# Patient Record
Sex: FEMALE | Race: WHITE | NOT HISPANIC OR LATINO | Employment: UNEMPLOYED | URBAN - METROPOLITAN AREA
[De-identification: names, ages, dates, MRNs, and addresses within clinical notes are randomized per-mention and may not be internally consistent; named-entity substitution may affect disease eponyms.]

---

## 2021-09-20 ENCOUNTER — MEDICAL CORRESPONDENCE (OUTPATIENT)
Dept: HEALTH INFORMATION MANAGEMENT | Facility: CLINIC | Age: 56
End: 2021-09-20

## 2021-09-25 ENCOUNTER — TRANSCRIBE ORDERS (OUTPATIENT)
Dept: OTHER | Age: 56
End: 2021-09-25

## 2021-09-25 DIAGNOSIS — M54.2 CERVICALGIA: Primary | ICD-10-CM

## 2021-10-06 ENCOUNTER — TRANSFERRED RECORDS (OUTPATIENT)
Dept: HEALTH INFORMATION MANAGEMENT | Facility: CLINIC | Age: 56
End: 2021-10-06

## 2022-08-05 ENCOUNTER — TRANSFERRED RECORDS (OUTPATIENT)
Dept: HEALTH INFORMATION MANAGEMENT | Facility: CLINIC | Age: 57
End: 2022-08-05

## 2022-08-08 ENCOUNTER — MEDICAL CORRESPONDENCE (OUTPATIENT)
Dept: HEALTH INFORMATION MANAGEMENT | Facility: CLINIC | Age: 57
End: 2022-08-08

## 2022-08-10 NOTE — TELEPHONE ENCOUNTER
DIAGNOSIS: cervical consult C7-T1 and T1-2, per pt ref by Dr. Santhosh Medrano at [facility] , MRI's and CT's taken @ [facility]    APPOINTMENT DATE: 8.25.22   NOTES STATUS DETAILS   OFFICE NOTE from referring provider     Operative Reprot Care Everywhere 11.3.17 FUSION ANTERIOR POSTERIOR CERVICAL LEVEL 01    10.16.14 FUSION DISCECTOMY ANTERIOR CERVICAL 02          LABS     MRI In process 7.14.22 cervical, thoracic spine, [facility]  4.3.18 cervical spine, [facility]  8.8.17 cervical spine, [facility]  9.22.14 cervical spine, [facility]   CT SCAN In process 9.2.21 cervical spine, [facility] - PACS  8.25.17 cervical spine, [facility]   XRAYS (IMAGES & REPORTS) In process 11.20.18 cervical spine, [facility]  2.14.18 cervical spine, [facility] - PACS  12.19.17 cervical spine, [facility]PAC  11.4.17 cervical spine, [facility] - PACS  11.3.17 cervical spine, [facility] PACS  8.31.17 cervical spine, [facility]  1.20.15 cervical spine, [facility]  10.18.14 cervical spine, [facility] PACS  10.16.14 cervical spine, [facility] PACS  9.18.14 cervical spine, [facility]     Action 8.10.22 3:35 PM [facility]   Action Taken Faxed request to [facility] [phone] for records. And [facility] for images [phone]     Action 8.19.22 10:53 AM [facility]   Action Taken Some images received. Faxed request to [facility] for other images [phone]      Action 8.24.22 MJ   Action Taken Pulled images into PACS, however not all images were received that were requested. Sent URGENT request     Action 8.25.22 MJ   Action Taken No images pushed. Called film room at [phone] No answer, will call back.  Called back- images at [facility]. Called [place] @ [phone] routed to [phone] [email or web address] [phone]    UPDATE: pulled numerous images into PACS.   Called and spoke with Kat at [facility]- 5 images didn't make it through. Kat stated she sent them all but will retry.    Pulled the rest of the images into PACs     Action August 25, 2022 1:10 PM  MT   Action Taken SENT A STAT REQUEST FOR MISSING MRI TO [facility] [phone].

## 2022-08-16 DIAGNOSIS — M54.2 NECK PAIN: Primary | ICD-10-CM

## 2022-08-25 ENCOUNTER — OFFICE VISIT (OUTPATIENT)
Dept: ORTHOPEDICS | Facility: CLINIC | Age: 57
End: 2022-08-25
Payer: COMMERCIAL

## 2022-08-25 ENCOUNTER — ANCILLARY PROCEDURE (OUTPATIENT)
Dept: GENERAL RADIOLOGY | Facility: CLINIC | Age: 57
End: 2022-08-25
Attending: ORTHOPAEDIC SURGERY
Payer: COMMERCIAL

## 2022-08-25 ENCOUNTER — PRE VISIT (OUTPATIENT)
Dept: ORTHOPEDICS | Facility: CLINIC | Age: 57
End: 2022-08-25

## 2022-08-25 DIAGNOSIS — M51.24 THORACIC DISC HERNIATION: Primary | ICD-10-CM

## 2022-08-25 DIAGNOSIS — M51.04 INTERVERTEBRAL DISC DISORDER OF THORACIC REGION WITH MYELOPATHY: ICD-10-CM

## 2022-08-25 DIAGNOSIS — M54.2 CERVICALGIA: ICD-10-CM

## 2022-08-25 DIAGNOSIS — M54.2 NECK PAIN: ICD-10-CM

## 2022-08-25 PROCEDURE — 72040 X-RAY EXAM NECK SPINE 2-3 VW: CPT | Mod: 77 | Performed by: STUDENT IN AN ORGANIZED HEALTH CARE EDUCATION/TRAINING PROGRAM

## 2022-08-25 PROCEDURE — 99204 OFFICE O/P NEW MOD 45 MIN: CPT | Mod: GC | Performed by: ORTHOPAEDIC SURGERY

## 2022-08-25 PROCEDURE — 72040 X-RAY EXAM NECK SPINE 2-3 VW: CPT | Mod: GC | Performed by: RADIOLOGY

## 2022-08-25 RX ORDER — LISINOPRIL AND HYDROCHLOROTHIAZIDE 12.5; 2 MG/1; MG/1
1 TABLET ORAL EVERY MORNING
Status: ON HOLD | COMMUNITY
Start: 2021-10-15 | End: 2022-11-11

## 2022-08-25 RX ORDER — FLUOXETINE 10 MG/1
10 CAPSULE ORAL AT BEDTIME
COMMUNITY
Start: 2022-08-17

## 2022-08-25 RX ORDER — FLUTICASONE PROPIONATE AND SALMETEROL XINAFOATE 115; 21 UG/1; UG/1
AEROSOL, METERED RESPIRATORY (INHALATION)
COMMUNITY
Start: 2022-08-23

## 2022-08-25 NOTE — LETTER
8/25/2022         RE: Emeli Beyer  [address] [facility] [address]        Dear Colleague,    Thank you for referring your patient, Emeli Beyer, to the [facility] [place]. Please see a copy of my visit note below.    Spine Surgical Hx:  ~2004 - ACDF with plate fixation C5-C7 (?).  Complicated by postop hoarseness.  10/16/2014 - ACDF with plate fixation C3-C5 (2 levels); use of structural corticocancellous allograft; Removal of anterior plate C5-C7 (CARLOS Medrano) for CSM.  [Implants: Synthes Vectra plate].  11/03/2017 - ACDF with platle fixation C7-T1; use of Cornerstone corticocancellous allograft 14 x 14mm and [place] DBM.  (CARLOS Medrano).  [Implants: Medtronic Zevo plate].      In-Person Visit    REASON FOR CONSULTATION: Consult (Thoracic back pain. )     REFERRING PHYSICIAN: Referred Self  PCP:No primary care provider on file.    History of Present Illness:  57 year old right-hand-dominant female, with past surgical history stated above presents as a referral from Dr. Medrano for treatment of a large T6-7 herniated disc with severe central canal stenosis and myelopathic presentation.  The patient states that she has a long history of back/neck pain that has been followed by Dr. Medrano at [facility]. She reports a fail in February and increased mid back pain after that point. Since that time she has had increase weakness in her lower extremities, numbness in her feet, and difficulty with balance and walking. She denies loss of bowel/bladder function or saddle anesthesia. The patient reports the she previously has been treated with injections to her back which have provided some relief. She has had not relief with PT, gabapentin, or antiinflammatories.     Back 50%, Leg 50%,  Right = Left    Previous treatment:   Lidocaine/steroid injections, PT, Gabapentin and anti-inflammatories     Oswestry (MARCO) Questionnaire    OSWESTRY DISABILITY INDEX 8/25/2022   Count  10   Sum 18   Oswestry Score (%) 36   Some recent data might be hidden        Neck Disability Index (NDI) Questionnaire    Neck Disability Index (NDI) 8/25/2022   Neck Disability Index: Count 10   NDI: Total Score = SUM (points for all 10 findings) 22   Neck Disability in Percent = (Total Score) / 50 * 100 44 (%)   Some recent data might be hidden        Visual Analog Pain Scale  Back Pain Scale 0-10: 3  Right leg pain: 0  Left leg pain: 0    PROMIS-10 Scores  Global Mental Health Score: (P) 11  Global Physical Health Score: (P) 13  PROMIS TOTAL - SUBSCORES: (P) 24    ROS:  A 12-point review of systems was completed and is negative except for otherwise noted above in the history of present illness.    Med Hx:  No past medical history on file.    Surg Hx:  No past surgical history on file.    Allergies:  Allergies   Allergen Reactions     Penicillins Rash and Shortness Of Breath     Penicillins     Clindamycin Rash     Naproxen Rash     Anaprox TABS       Meds:  Current Outpatient Medications   Medication     FLUoxetine (PROZAC) 10 MG capsule     lisinopril-hydrochlorothiazide (ZESTORETIC) 20-12.5 MG tablet     ADVAIR -21 MCG/ACT inhaler     No current facility-administered medications for this visit.       Fam Hx:  No family history on file.    P/S Hx:  Social History     Tobacco Use     Smoking status: Not on file     Smokeless tobacco: Not on file   Substance Use Topics     Alcohol use: Not on file         Physical Exam:  Very pleasant, healthy appearing, alert, oriented x 3, cooperative.  Normal mood and affect.  Not in cardiorespiratory distress.  There were no vitals taken for this visit.  Normal upright posture.    Wide gait. Unable to perform tandem walking. Loss of balance with romberg test.     1Normal gait without assistive device.  No antalgia / imbalance.    No gross spinal deformity, no skin lesions or surgical scars.  Localizes pain at Low back and neck   Tenderness: (-) midline, (-) paraspinal,  (-) R and L PSIS.    Cervical spine:     Appearance -no gross step-offs, kyphosis.    Motor -     C5: Deltoids R 5/5 and L 5/5 strength    C6: Biceps R 5/5 and L 5/5 strength     C7: Triceps R 5/5 and L 5/5 strength     C8:  R 5/5 and L 5/5 strength     T1: Dorsal interossei R 3/5 and L 3/5 strength        Sensation: intact to light touch in C5-T1      Special Tests -      Spurling's Test - Negative      Barrientos's Test - Negative       Lumbar Spine:    Appearance - No gross stepoffs or deformities    Motor -     L2-3: Hip flexion 5/5 R and 5/5 L strength          L3/4:  Knee extension R 5/5 and L 5/5 strength         L4/5:  Foot dorsiflexion R 5/5 L 5/5 and       EHL dorsiflexion R 4/5 L 4/5 strength         S1:  Plantarflexion/Peroneal Muscles  R 5/5 and L 5/5 strength    Sensation: intact to light touch L3-S1 distribution BLE       Imaging:    CT imaging of the thoracic spine reviewed with the patient which showed large T6-7 herniated disc with severe central canal stenosis.      Most recent EMG results not available in care everywhere for review.     Assessment:    1.  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  2.  S/p multiple cervical spine surgeries culminating in C3-T1 fusion.  3.  Pseudarthrosis C6-7.  4.  Cervical sagittal malalignment.    Plan:    Emeli presents as a referral from Dr. Medrano at [facility].  She is status post multiple surgeries with C3-T1 fusion.  She now presents with a large herniation of the disc at T6-T7 with severe central canal stenosis and myelopathic features.  Discussed with the patient that the compression of the spinal cord are likely contributing to her symptoms of weakness in her lower extremities as well as loss of balance since her fall in February.  The symptoms will not resolve independently or with nonoperative management.  The patient has not had formal physical therapy for her thoracic spine, but this is  unnecessary in this case as it will delay a surgery in a patient that is currently experienced myelopathic symptoms. For that reason we recommend surgical intervention of T6-T7 discectomy.  Discussed with the patient risks of surgery including risk of damage to the cord, damage to nerves, dural tears, infection, or repeat surgery.  Informed the patient that following surgery she would likely have a short hospital stay.  She will be restricted to not lifting greater than 10 pounds, no sports or other strenuous activities, and no extreme ranges of motion back for the first 6 weeks.  At the 6-week follow-up appointment she will be initiated with physical therapy.  Prior to surgery we will obtain MRI imaging performed outside of the [place] [facility] system.  We will also obtain a EOS full spine x-ray prior to surgery.  We will also schedule a preoperative appointment with the PACS team for evaluation of the patient's fit for surgery.  She is notably a pack per day smoker.  Advised the patient to discontinue, and she stated that she is motivated to discontinue at this time.    -Case request placed for transforaminal thoracic discectomy with interbody fusion T6-7 with the use of allograft.   -PAC Clinic referral placed   -Obtain CT/MRI thoracic spine from OSH  -XR EOS full spine     Xavier Samuels MD  PGY-1  Orthopaedic Surgery    Attestation:  I (Dr. Evaristo Allan - Spine Surgeon) have personally evaluated patient with PGY-1 Arvind, and agree with findings and plan outlined in the note, which I also edited.  I discussed at length with the patient/family, explained the nature of spinal condition, and formulated workup and/or treatment plan together.  All questions were answered to the best of my ability and to patient's apparent satisfaction.  45 minutes spent on the date of the encounter doing chart review/review of outside records/review of test results/interpretation of tests/patient visit/documentation/discussion with other  provider(s)/discussion with patient and family.    Evaristo Allan MD    Orthopaedic Spine Surgery  Dept Orthopaedic Surgery, [facility]  [phone] office, [phone] pager  [email or web address]      Again, thank you for allowing me to participate in the care of your patient.        Sincerely,        Evaristo Allan MD

## 2022-08-25 NOTE — PROGRESS NOTES
Spine Surgical Hx:  ~2004 - ACDF with plate fixation C5-C7 (?).  Complicated by postop hoarseness.  10/16/2014 - ACDF with plate fixation C3-C5 (2 levels); use of structural corticocancellous allograft; Removal of anterior plate C5-C7 (CARLOS Medrano) for CSM.  [Implants: Synthes Vectra plate].  11/03/2017 - ACDF with platle fixation C7-T1; use of Cornerstone corticocancellous allograft 14 x 14mm and [place] DBM.  (CARLOS Medrano).  [Implants: Medtronic Zevo plate].      In-Person Visit    REASON FOR CONSULTATION: Consult (Thoracic back pain. )     REFERRING PHYSICIAN: Referred Self  PCP:No primary care provider on file.    History of Present Illness:  57 year old right-hand-dominant female, with past surgical history stated above presents as a referral from Dr. Medrano for treatment of a large T6-7 herniated disc with severe central canal stenosis and myelopathic presentation.  The patient states that she has a long history of back/neck pain that has been followed by Dr. Medrano at [facility]. She reports a fail in February and increased mid back pain after that point. Since that time she has had increase weakness in her lower extremities, numbness in her feet, and difficulty with balance and walking. She denies loss of bowel/bladder function or saddle anesthesia. The patient reports the she previously has been treated with injections to her back which have provided some relief. She has had not relief with PT, gabapentin, or antiinflammatories.     Back 50%, Leg 50%,  Right = Left    Previous treatment:   Lidocaine/steroid injections, PT, Gabapentin and anti-inflammatories     Oswestry (MARCO) Questionnaire    OSWESTRY DISABILITY INDEX 8/25/2022   Count 10   Sum 18   Oswestry Score (%) 36   Some recent data might be hidden        Neck Disability Index (NDI) Questionnaire    Neck Disability Index (NDI) 8/25/2022   Neck Disability Index: Count 10   NDI: Total Score = SUM (points for all 10 findings) 22    Neck Disability in Percent = (Total Score) / 50 * 100 44 (%)   Some recent data might be hidden        Visual Analog Pain Scale  Back Pain Scale 0-10: 3  Right leg pain: 0  Left leg pain: 0    PROMIS-10 Scores  Global Mental Health Score: (P) 11  Global Physical Health Score: (P) 13  PROMIS TOTAL - SUBSCORES: (P) 24    ROS:  A 12-point review of systems was completed and is negative except for otherwise noted above in the history of present illness.    Med Hx:  No past medical history on file.    Surg Hx:  No past surgical history on file.    Allergies:  Allergies   Allergen Reactions     Penicillins Rash and Shortness Of Breath     Penicillins     Clindamycin Rash     Naproxen Rash     Anaprox TABS       Meds:  Current Outpatient Medications   Medication     FLUoxetine (PROZAC) 10 MG capsule     lisinopril-hydrochlorothiazide (ZESTORETIC) 20-12.5 MG tablet     ADVAIR -21 MCG/ACT inhaler     No current facility-administered medications for this visit.       Fam Hx:  No family history on file.    P/S Hx:  Social History     Tobacco Use     Smoking status: Not on file     Smokeless tobacco: Not on file   Substance Use Topics     Alcohol use: Not on file         Physical Exam:  Very pleasant, healthy appearing, alert, oriented x 3, cooperative.  Normal mood and affect.  Not in cardiorespiratory distress.  There were no vitals taken for this visit.  Normal upright posture.    Wide gait. Unable to perform tandem walking. Loss of balance with romberg test.     1Normal gait without assistive device.  No antalgia / imbalance.    No gross spinal deformity, no skin lesions or surgical scars.  Localizes pain at Low back and neck   Tenderness: (-) midline, (-) paraspinal, (-) R and L PSIS.    Cervical spine:     Appearance -no gross step-offs, kyphosis.    Motor -     C5: Deltoids R 5/5 and L 5/5 strength    C6: Biceps R 5/5 and L 5/5 strength     C7: Triceps R 5/5 and L 5/5 strength     C8:  R 5/5 and L 5/5  strength     T1: Dorsal interossei R 3/5 and L 3/5 strength        Sensation: intact to light touch in C5-T1      Special Tests -      Spurling's Test - Negative      Barrientos's Test - Negative       Lumbar Spine:    Appearance - No gross stepoffs or deformities    Motor -     L2-3: Hip flexion 5/5 R and 5/5 L strength          L3/4:  Knee extension R 5/5 and L 5/5 strength         L4/5:  Foot dorsiflexion R 5/5 L 5/5 and       EHL dorsiflexion R 4/5 L 4/5 strength         S1:  Plantarflexion/Peroneal Muscles  R 5/5 and L 5/5 strength    Sensation: intact to light touch L3-S1 distribution BLE       Imaging:    CT imaging of the thoracic spine reviewed with the patient which showed large T6-7 herniated disc with severe central canal stenosis.      Most recent EMG results not available in care everywhere for review.     Assessment:    1.  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  2.  S/p multiple cervical spine surgeries culminating in C3-T1 fusion.  3.  Pseudarthrosis C6-7.  4.  Cervical sagittal malalignment.    Plan:    Emeli presents as a referral from Dr. Medrano at [facility].  She is status post multiple surgeries with C3-T1 fusion.  She now presents with a large herniation of the disc at T6-T7 with severe central canal stenosis and myelopathic features.  Discussed with the patient that the compression of the spinal cord are likely contributing to her symptoms of weakness in her lower extremities as well as loss of balance since her fall in February.  The symptoms will not resolve independently or with nonoperative management.  The patient has not had formal physical therapy for her thoracic spine, but this is unnecessary in this case as it will delay a surgery in a patient that is currently experienced myelopathic symptoms. For that reason we recommend surgical intervention of T6-T7 discectomy.  Discussed with the patient risks of surgery including risk  of damage to the cord, damage to nerves, dural tears, infection, or repeat surgery.  Informed the patient that following surgery she would likely have a short hospital stay.  She will be restricted to not lifting greater than 10 pounds, no sports or other strenuous activities, and no extreme ranges of motion back for the first 6 weeks.  At the 6-week follow-up appointment she will be initiated with physical therapy.  Prior to surgery we will obtain MRI imaging performed outside of the [place] system.  We will also obtain a EOS full spine x-ray prior to surgery.  We will also schedule a preoperative appointment with the PACS team for evaluation of the patient's fit for surgery.  She is notably a pack per day smoker.  Advised the patient to discontinue, and she stated that she is motivated to discontinue at this time.    -Case request placed for transforaminal thoracic discectomy with interbody fusion T6-7 with the use of allograft.   -PAC Clinic referral placed   -Obtain CT/MRI thoracic spine from OSH  -XR EOS full spine     Xavier Samuels MD  PGY-1  Orthopaedic Surgery    Attestation:  I (Dr. Evaristo Allan - Spine Surgeon) have personally evaluated patient with PGY-1 Arvind, and agree with findings and plan outlined in the note, which I also edited.  I discussed at length with the patient/family, explained the nature of spinal condition, and formulated workup and/or treatment plan together.  All questions were answered to the best of my ability and to patient's apparent satisfaction.  45 minutes spent on the date of the encounter doing chart review/review of outside records/review of test results/interpretation of tests/patient visit/documentation/discussion with other provider(s)/discussion with patient and family.    Evaristo Allan MD    Orthopaedic Spine Surgery  Dept Orthopaedic Surgery, [facility]  [phone] office, [phone] pager  [email or web address]

## 2022-08-25 NOTE — LETTER
Date:September 2, 2022      Patient was self referred, no letter generated. Do not send.        [facility] Health Information

## 2022-09-01 ENCOUNTER — TELEPHONE (OUTPATIENT)
Dept: ORTHOPEDICS | Facility: CLINIC | Age: 57
End: 2022-09-01

## 2022-09-01 NOTE — TELEPHONE ENCOUNTER
Patient is scheduled for surgery with Dr. Allan    Spoke with: [facility]    Date of Surgery: 11/04/22    Location: [facility] OR    Informed patient they will need an adult  Yes    Pre op with Provider PAC    Pre-procedure COVID-19 Test: Patient will complete locally on 10/31/22    Additional imaging/appointments: POP made    Surgery packet: Received in clinic     Additional comments: N/A

## 2022-09-01 NOTE — TELEPHONE ENCOUNTER
Phoned patient to get her scheduled for surgery with Dr. Allan or set a tentative date.    Patient was unavailable,   Provided call back number in voicemail:   [phone].

## 2022-09-02 NOTE — TELEPHONE ENCOUNTER
FUTURE VISIT INFORMATION      SURGERY INFORMATION:    Date: 22    Location: [facility] or    Surgeon:  Evaristo Allan MD    Anesthesia Type:  general    Procedure: Transforaminal thoracic diskectomy with interbody fusion thoracic 6-7; use of allograft.    Consult: ov 22    RECORDS REQUESTED FROM:       Primary Care Provider: Rosetta Barrientos MD  - [facility]    Most recent EKG+ Tracin22- [facility]

## 2022-10-13 LAB
ABO/RH(D): NORMAL
ANTIBODY SCREEN: NEGATIVE
SPECIMEN EXPIRATION DATE: NORMAL

## 2022-10-14 ENCOUNTER — PRE VISIT (OUTPATIENT)
Dept: SURGERY | Facility: CLINIC | Age: 57
End: 2022-10-14

## 2022-10-14 ENCOUNTER — ANESTHESIA EVENT (OUTPATIENT)
Dept: SURGERY | Facility: CLINIC | Age: 57
End: 2022-10-14

## 2022-10-14 ENCOUNTER — LAB (OUTPATIENT)
Dept: LAB | Facility: CLINIC | Age: 57
End: 2022-10-14
Payer: COMMERCIAL

## 2022-10-14 ENCOUNTER — OFFICE VISIT (OUTPATIENT)
Dept: SURGERY | Facility: CLINIC | Age: 57
End: 2022-10-14
Payer: COMMERCIAL

## 2022-10-14 VITALS
HEART RATE: 70 BPM | WEIGHT: 148.4 LBS | RESPIRATION RATE: 16 BRPM | SYSTOLIC BLOOD PRESSURE: 144 MMHG | OXYGEN SATURATION: 99 % | DIASTOLIC BLOOD PRESSURE: 96 MMHG | TEMPERATURE: 98.1 F | BODY MASS INDEX: 23.85 KG/M2 | HEIGHT: 66 IN

## 2022-10-14 DIAGNOSIS — Z01.818 PREOP EXAMINATION: ICD-10-CM

## 2022-10-14 DIAGNOSIS — Z01.818 PREOP EXAMINATION: Primary | ICD-10-CM

## 2022-10-14 LAB
ANION GAP SERPL CALCULATED.3IONS-SCNC: 13 MMOL/L (ref 7–15)
BUN SERPL-MCNC: 12 MG/DL (ref 6–20)
CALCIUM SERPL-MCNC: 10 MG/DL (ref 8.6–10)
CHLORIDE SERPL-SCNC: 97 MMOL/L (ref 98–107)
CREAT SERPL-MCNC: 0.59 MG/DL (ref 0.51–0.95)
DEPRECATED HCO3 PLAS-SCNC: 25 MMOL/L (ref 22–29)
ERYTHROCYTE [DISTWIDTH] IN BLOOD BY AUTOMATED COUNT: 12.9 % (ref 10–15)
GFR SERPL CREATININE-BSD FRML MDRD: >90 ML/MIN/1.73M2
GLUCOSE SERPL-MCNC: 96 MG/DL (ref 70–99)
HCT VFR BLD AUTO: 36.5 % (ref 35–47)
HGB BLD-MCNC: 12.5 G/DL (ref 11.7–15.7)
MCH RBC QN AUTO: 33.9 PG (ref 26.5–33)
MCHC RBC AUTO-ENTMCNC: 34.2 G/DL (ref 31.5–36.5)
MCV RBC AUTO: 99 FL (ref 78–100)
PLATELET # BLD AUTO: 339 10E3/UL (ref 150–450)
POTASSIUM SERPL-SCNC: 4.1 MMOL/L (ref 3.4–5.3)
RBC # BLD AUTO: 3.69 10E6/UL (ref 3.8–5.2)
SODIUM SERPL-SCNC: 135 MMOL/L (ref 136–145)
WBC # BLD AUTO: 7.7 10E3/UL (ref 4–11)

## 2022-10-14 PROCEDURE — 86850 RBC ANTIBODY SCREEN: CPT | Performed by: PHYSICIAN ASSISTANT

## 2022-10-14 PROCEDURE — 86901 BLOOD TYPING SEROLOGIC RH(D): CPT | Performed by: PHYSICIAN ASSISTANT

## 2022-10-14 PROCEDURE — 80048 BASIC METABOLIC PNL TOTAL CA: CPT | Performed by: PATHOLOGY

## 2022-10-14 PROCEDURE — 99203 OFFICE O/P NEW LOW 30 MIN: CPT | Performed by: PHYSICIAN ASSISTANT

## 2022-10-14 PROCEDURE — 85027 COMPLETE CBC AUTOMATED: CPT | Performed by: PATHOLOGY

## 2022-10-14 PROCEDURE — 36415 COLL VENOUS BLD VENIPUNCTURE: CPT | Performed by: PATHOLOGY

## 2022-10-14 RX ORDER — DOCUSATE SODIUM 100 MG/1
100-200 CAPSULE, LIQUID FILLED ORAL EVERY MORNING
Status: ON HOLD | COMMUNITY
End: 2022-11-10

## 2022-10-14 RX ORDER — BIOTIN 1000 MCG
1000 TABLET,CHEWABLE ORAL
Status: ON HOLD | COMMUNITY
Start: 2021-09-24 | End: 2022-11-07

## 2022-10-14 RX ORDER — IPRATROPIUM BROMIDE AND ALBUTEROL SULFATE 2.5; .5 MG/3ML; MG/3ML
3 SOLUTION RESPIRATORY (INHALATION) ONCE
Status: CANCELLED | OUTPATIENT
Start: 2022-10-14 | End: 2022-10-14

## 2022-10-14 ASSESSMENT — PAIN SCALES - GENERAL: PAINLEVEL: MODERATE PAIN (4)

## 2022-10-14 ASSESSMENT — LIFESTYLE VARIABLES: TOBACCO_USE: 1

## 2022-10-14 NOTE — H&P
Pre-Operative H & P     CC:  Preoperative exam to assess for increased cardiopulmonary risk while undergoing surgery and anesthesia.    Date of Encounter: 10/14/2022  Primary Care Physician:  System, Provider Not In     Reason for visit:   Encounter Diagnosis   Name Primary?     Preop examination Yes       HPI  Emeli Beyer is a 57 year old female who presents for pre-operative H & P in preparation for  Procedure Information     Date/Time: 11/4/22     Procedure: Transforaminal thoracic diskectomy with interbody fusion thoracic 6-7; use of allograft     Anesthesia type: general    Pre-op diagnosis: thoracic disc disease    Location: [facility]    Providers: Keniamarcioavtar          Patient is being evaluated for comorbid conditions of hypertension, asthma, tobacco use      Ms. Beyer has known T6-7 herniated disc with severe central canal stenosis and myelopathic presentation. She has had 3 previous spine surgeries. She had a fall this past February and has now noticed increased back pain. She has tried medications and PT without significant relief. She was referred to Dr. Allan for further evaluation and is now scheduled for the above procedure.      History is obtained from the patient and chart review    Hx of abnormal bleeding or anti-platelet use: denies    Menstrual history: No LMP recorded (lmp unknown). Patient has had a hysterectomy.     Past Medical History  Past Medical History:   Diagnosis Date     Asthma      HTN (hypertension)        Past Surgical History  Past Surgical History:   Procedure Laterality Date     APPENDECTOMY       HYSTERECTOMY       ORTHOPEDIC SURGERY      multiple previous spine       Prior to Admission Medications  Current Outpatient Medications   Medication Sig Dispense Refill     ADVAIR -21 MCG/ACT inhaler INHALE 2 PUFFS BY MOUTH TWICE DAILY       Biotin 1000 MCG CHEW Take 1,000 mcg by mouth every 48 hours       docusate sodium  (COLACE) 100 MG capsule Take 100 mg by mouth every morning       FLUoxetine (PROZAC) 10 MG capsule Take 10 mg by mouth At Bedtime       lisinopril-hydrochlorothiazide (ZESTORETIC) 20-12.5 MG tablet Take 1 tablet by mouth every morning       UNABLE TO FIND daily at 2 pm MEDICATION NAME: Muscle and Pain Relief         Allergies  Allergies   Allergen Reactions     Penicillins Rash and Shortness Of Breath     Penicillins     Clindamycin Rash     Naproxen Rash     Anaprox TABS       Social History  Social History     Socioeconomic History     Marital status: Single     Spouse name: Not on file     Number of children: Not on file     Years of education: Not on file     Highest education level: Not on file   Occupational History     Not on file   Tobacco Use     Smoking status: Every Day     Packs/day: 1.00     Types: Cigarettes     Smokeless tobacco: Never   Substance and Sexual Activity     Alcohol use: Yes     Alcohol/week: 2.0 standard drinks     Types: 2 Standard drinks or equivalent per week     Drug use: Not Currently     Sexual activity: Not on file   Other Topics Concern     Not on file   Social History Narrative     Not on file     Social Determinants of Health     Financial Resource Strain: Not on file   Food Insecurity: Not on file   Transportation Needs: Not on file   Physical Activity: Not on file   Stress: Not on file   Social Connections: Not on file   Intimate Partner Violence: Not on file   Housing Stability: Not on file       Family History  Family History   Problem Relation Age of Onset     Post Operative Nausea and Vomiting Mother      Deep Vein Thrombosis (DVT) No family hx of        Review of Systems  The complete review of systems is negative other than noted in the HPI or here.   Anesthesia Evaluation   Pt has had prior anesthetic.     History of anesthetic complications  - PONV.      ROS/MED HX  ENT/Pulmonary:     (+) ROBIN risk factors, hypertension, observed stopped breathing, tobacco use, asthma  "Treatment: Inhaled steroids and Inhaler prn,      Neurologic: Comment: Thoracic disc disease, myelopathic symptoms       Cardiovascular:     (+) hypertension-----Previous cardiac testing   Echo: Date: Results:    Stress Test: Date: Results:    ECG Reviewed: Date: 7/2022 Results:  NSR  Cath: Date: Results:   (-) taking anticoagulants/antiplatelets   METS/Exercise Tolerance: 4 - Raking leaves, gardening Comment: Walking 10-12 blocks wihtout exertional symptoms. Limited by back pain   Hematologic:  - neg hematologic  ROS  (-) history of blood clots and history of blood transfusion   Musculoskeletal: Comment: Back pain      GI/Hepatic:     (+) GERD (intermittent, using OTC meds PRN),     Renal/Genitourinary:  - neg Renal ROS     Endo:  - neg endo ROS  (-) chronic steroid usage   Psychiatric/Substance Use:  - neg psychiatric ROS     Infectious Disease:  - neg infectious disease ROS     Malignancy:  - neg malignancy ROS     Other:            BP (!) 144/96 (BP Location: Right arm, Patient Position: Sitting, Cuff Size: Adult Regular)   Pulse 70   Temp 98.1  F (36.7  C) (Oral)   Resp 16   Ht 1.664 m (5' 5.5\")   Wt 67.3 kg (148 lb 6.4 oz)   LMP  (LMP Unknown)   SpO2 99%   Breastfeeding No   BMI 24.32 kg/m      Physical Exam   Constitutional: Awake, alert, cooperative, no apparent distress, and appears stated age.  Eyes: Pupils equal, round and reactive to light, extra ocular muscles intact, sclera clear, conjunctiva normal.  HENT: Normocephalic, oral pharynx with moist mucus membranes, good dentition.   Respiratory: Coarse breath sounds, mild wheezing  Cardiovascular: Regular rate and rhythm, normal S1 and S2, and no murmur noted.  Carotids no bruits. No edema. Palpable pulses to radial  arteries.   GI: Normal bowel sounds, soft, non-distended, non-tender  Genitourinary:  deferred  Skin: Warm and dry.   Musculoskeletal: Full ROM of neck. There is no redness, warmth, or swelling of the exposed joints. Gross motor " strength is normal.    Neurologic: Awake, alert, oriented to name, place and time. Cranial nerves II-XII are grossly intact. Gait is normal.   Neuropsychiatric: Calm, cooperative. Normal affect.     Prior Labs/Diagnostic Studies   All labs and imaging personally reviewed     EKG/ stress test - if available please see in ROS above   No results found.  No flowsheet data found.      The patient's records and results personally reviewed by this provider.     Outside records reviewed from: Care Everywhere    LAB/DIAGNOSTIC STUDIES TODAY:  CBC, BMP, T&S    Assessment      Emeli Beyer is a 57 year old female seen as a PAC referral for risk assessment and optimization for anesthesia.    Plan/Recommendations  Pt will be optimized for the proposed procedure.  See below for details on the assessment, risk, and preoperative recommendations    NEUROLOGY  - No history of TIA, CVA or seizure  -Post Op delirium risk factors:  No risk identified    ENT  - No current airway concerns.  Will need to be reassessed day of surgery.  Mallampati: Unable to assess  TM: Unable to assess    CARDIAC  - No history of CAD and Afib   -BP elevated in clinic today- patient reports when she was monitoring at home it ran on average 130s/60s. I have asked her to start monitoring at home over the next week and call her PCP if it remains elevated. Patient is agreeable to plan   - METS (Metabolic Equivalents)  Patient performs 4 or more METS exercise without symptoms            Total Score: 0      RCRI-Very low risk: Class 1 0.4% complication rate            Total Score: 0        PULMONARY  ROBIN Medium Risk  *This score is based on incomplete data *           Total Score: 3*    ROBIN: Snores loudly    ROBIN: Hypertension    ROBIN: Over 50 ys old        Criteria with incomplete data:    ROBIN: BMI over 35 kg/m2      - Asthma  using advair daily and albuterol about once/week. She continues to smoke 1PPD. Encouraged decreased smoking, continue to use inhalers. Mild  "wheezing on exam today- will order duoneb for preop   - Tobacco History      History   Smoking Status     Every Day     Packs/day: 1.00     Types: Cigarettes   Smokeless Tobacco     Never       GI  PONV Medium Risk  Total Score: 2           1 AN PONV: Pt is Female    1 AN PONV: Intended Post Op Opioids        /RENAL  - Baseline Creatinine  WNL    ENDOCRINE    - BMI: Estimated body mass index is 24.32 kg/m  as calculated from the following:    Height as of this encounter: 1.664 m (5' 5.5\").    Weight as of this encounter: 67.3 kg (148 lb 6.4 oz).  Healthy Weight (BMI 18.5-24.9)  - No history of Diabetes Mellitus    HEME  VTE Low Risk 0.26%            Total Score: 0      - No history of abnormal bleeding or antiplatelet use.    MSK  -h/o multiple previous spine surgeries. Now with above procedure planned    ALLERGY  Patient has PCN allergy- reports rash and difficulty breathing. I offered to refer patient to Dr. Bryant for allergy testing, but patient declined      Patient was discussed with Dr. Owusu    The patient is optimized for their procedure. AVS with information on surgery time/arrival time, meds and NPO status given by nursing staff. No further diagnostic testing indicated.      On the day of service:     Prep time: 7 minutes  Visit time: 19 minutes  Documentation time: 9 minutes  ------------------------------------------  Total time: 35 minutes      Eveline Suero PA-C  Preoperative Assessment Center  [facility]  Clinic and Surgery Center  Phone: [phone]  Fax: [phone]  "

## 2022-10-14 NOTE — ADDENDUM NOTE
Addendum  created 10/14/22 1242 by Eveline Suero PA-C    Order list changed, Order sets accessed

## 2022-10-14 NOTE — PATIENT INSTRUCTIONS
Preparing for Your Surgery      Name:  Emeli Beyer   MRN:  [id number]   :  1965   Today's Date:  10/14/2022       Arriving for surgery:  Surgery date:  22  Arrival time:  05:30 am     Surgeries and procedures: Adult patients can have 2 visitors all through the surgery process.     Visiting hours: 8 a.m. to 8:30 p.m.     Hospital: Adult patients and children under age 18 can have 4 visitor at a time     No visitors under the age of 5 are allowed for hospital patients.  Double occupancy rooms: Patients can have only two visitors at a time.     Patients with disabilities: Can have a support person with them (family member, service provider     Or someone well informed about their needs) plus the allowed number of visitors     Patients confirmed or suspected to have symptoms of COVID 19 or flu:     No visitors allowed for adult patients.   Children (under age 18) can have 1 named visitor.     People who are sick or showing symptoms of COVID 19 or flu:    Are not allowed to visit patients--we can only make exceptions in special situations.       Please follow these guidelines for your visit:   Arrive wearing a mask over your mouth and nose; we will give you a medical mask to wear    If you arrive wearing a cloth mask.   Keep it on during your entire visit, even when in patient's room.   If you don't wear a mask we'll ask you to leave.     Clean your hands with alcohol hand . Do this when you arrive at and leave the building and patient room,    And again after you touch your mask or anything in the room.     You can t visit if you have a fever, cough, shortness of breath, muscle aches, headaches, sore throat    Or diarrhea      Stay 6 feet away from others during your visit and between visits     Go directly to and from the room you are visiting.     Stay in the patient s room during your visit. Limit going to other places in the hospital as much as possible     Leave bags and jackets at home or  in the car.     For everyone s health, please don t come and go during your visit. That includes for smoking   during your visit.     Please come to:   [facility] [address]  [address] [place]  [address]  - parking is available in front of [facility] from 5:15AM to 8:00PM. If you prefer, park your car in the Green Lot.  -Proceed to the 3rd floor, check in at the Adult Surgery Waiting Lounge. [phone]    If an escort is needed stop at the Information Desk in the lobby. Inform the information person that you are here for surgery. An escort to the Adult Surgery Waiting Lounge will be provided.    What can I eat or drink?  -  You may eat and drink normally up to 8 hours prior to arrival time.  -  You may have clear liquids until 2 hours prior to arrival time.    Examples of clear liquids:  Water  Clear broth  Juices (apple, white grape, white cranberry  and cider) without pulp  Noncarbonated, powder based beverages  (lemonade and Rebel-Aid)  Sodas (Sprite, 7-Up, ginger ale and seltzer)  Coffee or tea (without milk or cream)  Gatorade    -  No Alcohol for at least 24 hours before surgery.     Which medicines can I take?  Hold Aspirin for 7 days before surgery.   Hold Multivitamins for 7 days before surgery.  Hold Supplements for 7 days before surgery.  Hold Ibuprofen (Advil, Motrin) for 1 day before surgery--unless otherwise directed by surgeon.  Hold Naproxen (Aleve) for 4 days before surgery.  -  DO NOT take these medications the day of surgery:  Lisinopril-hydrochlorothiazide (zestoretic).  -  PLEASE TAKE these medications the day of surgery:  Tylenol if needed. Bring inhaler to hospital if using.    How do I prepare myself?  - Please take 2 showers before surgery using Scrubcare or Hibiclens soap.    Use this soap only from the neck to your toes.     Leave the soap on your skin for one minute--then rinse thoroughly.      You may  use your own shampoo and conditioner. No other hair products.   - Please remove all jewelry and body piercings.  - No lotions, deodorants or fragrance.  - No makeup or fingernail polish.   - Bring your ID and insurance card.    -If you have a Deep Brain Stimulator, Spinal Cord Stimulator, or any Neuro Stimulator device---you must bring the remote control to the hospital.        Questions or Concerns:    - For any questions regarding the day of surgery or your hospital stay, please contact the Pre Admission Nursing Office at [phone].       - If you have health changes between today and your surgery, please call your surgeon.       - For questions after surgery, please call your surgeons office.

## 2022-10-18 LAB
BLOOD BANK CHART COMMENT: NORMAL
SPECIMEN EXPIRATION DATE: NORMAL

## 2022-10-21 RX ORDER — CEFAZOLIN SODIUM/WATER 2 G/20 ML
2 SYRINGE (ML) INTRAVENOUS SEE ADMIN INSTRUCTIONS
Status: CANCELLED | OUTPATIENT
Start: 2022-10-21

## 2022-10-21 RX ORDER — ACETAMINOPHEN 325 MG/1
975 TABLET ORAL ONCE
Status: CANCELLED | OUTPATIENT
Start: 2022-10-21 | End: 2022-10-21

## 2022-10-21 RX ORDER — GABAPENTIN 300 MG/1
300 CAPSULE ORAL
Status: CANCELLED | OUTPATIENT
Start: 2022-10-21

## 2022-10-21 RX ORDER — CEFAZOLIN SODIUM/WATER 2 G/20 ML
2 SYRINGE (ML) INTRAVENOUS
Status: CANCELLED | OUTPATIENT
Start: 2022-10-21

## 2022-11-01 ENCOUNTER — TELEPHONE (OUTPATIENT)
Dept: ORTHOPEDICS | Facility: CLINIC | Age: 57
End: 2022-11-01

## 2022-11-01 NOTE — TELEPHONE ENCOUNTER
[facility] Call Center    Phone Message    May a detailed message be left on voicemail: yes     Reason for Call Patient is scheduled to have Surgery on Nov 7. She Has Question Regarding Her Covid Test. Please call Patient   Action Taken: Message routed to:  [facility] ([facility]): mirian    Travel Screening: Not Applicable

## 2022-11-01 NOTE — TELEPHONE ENCOUNTER
Writer called and talked with patient on the phone. Pt states that the covid test has been scheduled for this up coming Thursday which is with in time frame needed for Surgery on Monday 11/7/22. Pt was told by the person scheduling the test that they are uncertain that the results would be completed by Monday. Writer confirmed with Bobbi BROWN that works with Dr. Allan's team that Thursday is an appropriate time frame to have the covid test done.     Kassi Smith LPN

## 2022-11-03 DIAGNOSIS — Z11.59 ENCOUNTER FOR SCREENING FOR OTHER VIRAL DISEASES: Primary | ICD-10-CM

## 2022-11-07 ENCOUNTER — APPOINTMENT (OUTPATIENT)
Dept: GENERAL RADIOLOGY | Facility: CLINIC | Age: 57
End: 2022-11-07
Attending: ORTHOPAEDIC SURGERY
Payer: COMMERCIAL

## 2022-11-07 ENCOUNTER — ANESTHESIA EVENT (OUTPATIENT)
Dept: SURGERY | Facility: CLINIC | Age: 57
End: 2022-11-07
Payer: COMMERCIAL

## 2022-11-07 ENCOUNTER — ANESTHESIA (OUTPATIENT)
Dept: SURGERY | Facility: CLINIC | Age: 57
End: 2022-11-07
Payer: COMMERCIAL

## 2022-11-07 ENCOUNTER — TRANSFERRED RECORDS (OUTPATIENT)
Dept: HEALTH INFORMATION MANAGEMENT | Facility: CLINIC | Age: 57
End: 2022-11-07

## 2022-11-07 ENCOUNTER — HOSPITAL ENCOUNTER (INPATIENT)
Facility: CLINIC | Age: 57
LOS: 4 days | Discharge: HOME | End: 2022-11-11
Attending: ORTHOPAEDIC SURGERY | Admitting: ORTHOPAEDIC SURGERY
Payer: COMMERCIAL

## 2022-11-07 DIAGNOSIS — R79.0 LOW SERUM PHOSPHORUS FOR AGE: ICD-10-CM

## 2022-11-07 DIAGNOSIS — M48.04 THORACIC SPINAL STENOSIS: Primary | ICD-10-CM

## 2022-11-07 LAB
BASE EXCESS BLDA CALC-SCNC: 1.3 MMOL/L (ref -9–1.8)
CA-I BLD-MCNC: 4.1 MG/DL (ref 4.4–5.2)
ERYTHROCYTE [DISTWIDTH] IN BLOOD BY AUTOMATED COUNT: 12.5 % (ref 10–15)
GLUCOSE BLD-MCNC: 136 MG/DL (ref 70–99)
GLUCOSE BLDC GLUCOMTR-MCNC: 98 MG/DL (ref 70–99)
HCO3 BLD-SCNC: 26 MMOL/L (ref 21–28)
HCT VFR BLD AUTO: 25.9 % (ref 35–47)
HGB BLD-MCNC: 8.6 G/DL (ref 11.7–15.7)
HGB BLD-MCNC: 9.5 G/DL (ref 11.7–15.7)
LACTATE SERPL-SCNC: 1.4 MMOL/L (ref 0.7–2)
MCH RBC QN AUTO: 33.3 PG (ref 26.5–33)
MCHC RBC AUTO-ENTMCNC: 33.2 G/DL (ref 31.5–36.5)
MCV RBC AUTO: 100 FL (ref 78–100)
O2/TOTAL GAS SETTING VFR VENT: 40 %
PCO2 BLD: 42 MM HG (ref 35–45)
PH BLD: 7.4 [PH] (ref 7.35–7.45)
PLATELET # BLD AUTO: 280 10E3/UL (ref 150–450)
PO2 BLD: 175 MM HG (ref 80–105)
POTASSIUM BLD-SCNC: 4.3 MMOL/L (ref 3.4–5.3)
RBC # BLD AUTO: 2.58 10E6/UL (ref 3.8–5.2)
SODIUM SERPL-SCNC: 136 MMOL/L (ref 133–144)
WBC # BLD AUTO: 12 10E3/UL (ref 4–11)

## 2022-11-07 PROCEDURE — 22842 INSERT SPINE FIXATION DEVICE: CPT | Mod: GC | Performed by: ORTHOPAEDIC SURGERY

## 2022-11-07 PROCEDURE — 250N000011 HC RX IP 250 OP 636: Performed by: ORTHOPAEDIC SURGERY

## 2022-11-07 PROCEDURE — 99232 SBSQ HOSP IP/OBS MODERATE 35: CPT | Performed by: INTERNAL MEDICINE

## 2022-11-07 PROCEDURE — 250N000009 HC RX 250: Performed by: NURSE ANESTHETIST, CERTIFIED REGISTERED

## 2022-11-07 PROCEDURE — 250N000013 HC RX MED GY IP 250 OP 250 PS 637

## 2022-11-07 PROCEDURE — 250N000011 HC RX IP 250 OP 636: Performed by: NURSE ANESTHETIST, CERTIFIED REGISTERED

## 2022-11-07 PROCEDURE — 0RG60AJ FUSION OF THORACIC VERTEBRAL JOINT WITH INTERBODY FUSION DEVICE, POSTERIOR APPROACH, ANTERIOR COLUMN, OPEN APPROACH: ICD-10-PCS | Performed by: ORTHOPAEDIC SURGERY

## 2022-11-07 PROCEDURE — 22610 ARTHRD PST TQ 1NTRSPC THRC: CPT | Mod: 22 | Performed by: ORTHOPAEDIC SURGERY

## 2022-11-07 PROCEDURE — 22206 INCIS SPINE 3 COLUMN THORAC: CPT | Mod: 22 | Performed by: ORTHOPAEDIC SURGERY

## 2022-11-07 PROCEDURE — 83605 ASSAY OF LACTIC ACID: CPT | Performed by: NURSE ANESTHETIST, CERTIFIED REGISTERED

## 2022-11-07 PROCEDURE — 258N000003 HC RX IP 258 OP 636: Performed by: INTERNAL MEDICINE

## 2022-11-07 PROCEDURE — 250N000009 HC RX 250: Performed by: ORTHOPAEDIC SURGERY

## 2022-11-07 PROCEDURE — 272N000004 HC RX 272: Performed by: ORTHOPAEDIC SURGERY

## 2022-11-07 PROCEDURE — 258N000003 HC RX IP 258 OP 636: Performed by: NURSE ANESTHETIST, CERTIFIED REGISTERED

## 2022-11-07 PROCEDURE — 250N000013 HC RX MED GY IP 250 OP 250 PS 637: Performed by: ANESTHESIOLOGY

## 2022-11-07 PROCEDURE — 250N000009 HC RX 250: Performed by: PHYSICIAN ASSISTANT

## 2022-11-07 PROCEDURE — C1713 ANCHOR/SCREW BN/BN,TIS/BN: HCPCS | Performed by: ORTHOPAEDIC SURGERY

## 2022-11-07 PROCEDURE — 999N000157 HC STATISTIC RCP TIME EA 10 MIN

## 2022-11-07 PROCEDURE — 250N000009 HC RX 250: Performed by: STUDENT IN AN ORGANIZED HEALTH CARE EDUCATION/TRAINING PROGRAM

## 2022-11-07 PROCEDURE — 250N000011 HC RX IP 250 OP 636

## 2022-11-07 PROCEDURE — 999N000015 HC STATISTIC ARTERIAL MONITORING DAILY

## 2022-11-07 PROCEDURE — 250N000009 HC RX 250: Performed by: ANESTHESIOLOGY

## 2022-11-07 PROCEDURE — 250N000011 HC RX IP 250 OP 636: Performed by: STUDENT IN AN ORGANIZED HEALTH CARE EDUCATION/TRAINING PROGRAM

## 2022-11-07 PROCEDURE — 250N000011 HC RX IP 250 OP 636: Performed by: PHYSICIAN ASSISTANT

## 2022-11-07 PROCEDURE — 258N000003 HC RX IP 258 OP 636: Performed by: ANESTHESIOLOGY

## 2022-11-07 PROCEDURE — 370N000017 HC ANESTHESIA TECHNICAL FEE, PER MIN: Performed by: ORTHOPAEDIC SURGERY

## 2022-11-07 PROCEDURE — 272N000001 HC OR GENERAL SUPPLY STERILE: Performed by: ORTHOPAEDIC SURGERY

## 2022-11-07 PROCEDURE — 258N000003 HC RX IP 258 OP 636: Performed by: PHYSICIAN ASSISTANT

## 2022-11-07 PROCEDURE — 61783 SCAN PROC SPINAL: CPT | Mod: GC | Performed by: ORTHOPAEDIC SURGERY

## 2022-11-07 PROCEDURE — 22614 ARTHRD PST TQ 1NTRSPC EA ADD: CPT | Mod: 22 | Performed by: ORTHOPAEDIC SURGERY

## 2022-11-07 PROCEDURE — 250N000011 HC RX IP 250 OP 636: Performed by: ANESTHESIOLOGY

## 2022-11-07 PROCEDURE — 22216 INCIS ADDL SPINE SEGMENT: CPT | Mod: 22 | Performed by: ORTHOPAEDIC SURGERY

## 2022-11-07 PROCEDURE — 4A11X4G MONITORING OF PERIPHERAL NERVOUS ELECTRICAL ACTIVITY, INTRAOPERATIVE, EXTERNAL APPROACH: ICD-10-PCS | Performed by: ORTHOPAEDIC SURGERY

## 2022-11-07 PROCEDURE — 22212 INCIS 1 VERTEBRAL SEG THORAC: CPT | Mod: 22 | Performed by: ORTHOPAEDIC SURGERY

## 2022-11-07 PROCEDURE — 22556 ARTHRD ANT NTRBD MIN DSC THC: CPT | Mod: 22 | Performed by: ORTHOPAEDIC SURGERY

## 2022-11-07 PROCEDURE — 84132 ASSAY OF SERUM POTASSIUM: CPT | Performed by: NURSE ANESTHETIST, CERTIFIED REGISTERED

## 2022-11-07 PROCEDURE — 258N000003 HC RX IP 258 OP 636: Performed by: STUDENT IN AN ORGANIZED HEALTH CARE EDUCATION/TRAINING PROGRAM

## 2022-11-07 PROCEDURE — 22853 INSJ BIOMECHANICAL DEVICE: CPT | Mod: GC | Performed by: ORTHOPAEDIC SURGERY

## 2022-11-07 PROCEDURE — 999N000179 XR SURGERY CARM FLUORO LESS THAN 5 MIN W STILLS

## 2022-11-07 PROCEDURE — 250N000013 HC RX MED GY IP 250 OP 250 PS 637: Performed by: STUDENT IN AN ORGANIZED HEALTH CARE EDUCATION/TRAINING PROGRAM

## 2022-11-07 PROCEDURE — 250N000013 HC RX MED GY IP 250 OP 250 PS 637: Performed by: PHYSICIAN ASSISTANT

## 2022-11-07 PROCEDURE — 20930 SP BONE ALGRFT MORSEL ADD-ON: CPT | Mod: GC | Performed by: ORTHOPAEDIC SURGERY

## 2022-11-07 PROCEDURE — 250N000025 HC SEVOFLURANE, PER MIN: Performed by: ORTHOPAEDIC SURGERY

## 2022-11-07 PROCEDURE — 250N000013 HC RX MED GY IP 250 OP 250 PS 637: Performed by: INTERNAL MEDICINE

## 2022-11-07 PROCEDURE — 85014 HEMATOCRIT: CPT | Performed by: ANESTHESIOLOGY

## 2022-11-07 PROCEDURE — 120N000002 HC R&B MED SURG/OB UMMC

## 2022-11-07 PROCEDURE — 82330 ASSAY OF CALCIUM: CPT | Performed by: NURSE ANESTHETIST, CERTIFIED REGISTERED

## 2022-11-07 PROCEDURE — 710N000010 HC RECOVERY PHASE 1, LEVEL 2, PER MIN: Performed by: ORTHOPAEDIC SURGERY

## 2022-11-07 PROCEDURE — 360N000085 HC SURGERY LEVEL 5 W/ FLUORO, PER MIN: Performed by: ORTHOPAEDIC SURGERY

## 2022-11-07 PROCEDURE — 8E0WXBF COMPUTER ASSISTED PROCEDURE OF TRUNK REGION, WITH FLUOROSCOPY: ICD-10-PCS | Performed by: ORTHOPAEDIC SURGERY

## 2022-11-07 PROCEDURE — 0RG7071 FUSION OF 2 TO 7 THORACIC VERTEBRAL JOINTS WITH AUTOLOGOUS TISSUE SUBSTITUTE, POSTERIOR APPROACH, POSTERIOR COLUMN, OPEN APPROACH: ICD-10-PCS | Performed by: ORTHOPAEDIC SURGERY

## 2022-11-07 PROCEDURE — 999N000141 HC STATISTIC PRE-PROCEDURE NURSING ASSESSMENT: Performed by: ORTHOPAEDIC SURGERY

## 2022-11-07 PROCEDURE — 250N000011 HC RX IP 250 OP 636: Performed by: INTERNAL MEDICINE

## 2022-11-07 PROCEDURE — C1762 CONN TISS, HUMAN(INC FASCIA): HCPCS | Performed by: ORTHOPAEDIC SURGERY

## 2022-11-07 PROCEDURE — 258N000003 HC RX IP 258 OP 636

## 2022-11-07 PROCEDURE — 0RB90ZZ EXCISION OF THORACIC VERTEBRAL DISC, OPEN APPROACH: ICD-10-PCS | Performed by: ORTHOPAEDIC SURGERY

## 2022-11-07 PROCEDURE — 20936 SP BONE AGRFT LOCAL ADD-ON: CPT | Mod: GC | Performed by: ORTHOPAEDIC SURGERY

## 2022-11-07 PROCEDURE — 01N80ZZ RELEASE THORACIC NERVE, OPEN APPROACH: ICD-10-PCS | Performed by: ORTHOPAEDIC SURGERY

## 2022-11-07 DEVICE — IMP SCR MEDT 5.5/6.0MM SOLERA 5.5X45MM MA 55840005545: Type: IMPLANTABLE DEVICE | Site: SPINE THORACIC | Status: FUNCTIONAL

## 2022-11-07 DEVICE — IMP SCR MEDT 5.5/6.0MM SOLERA 5.0X50MM MA 55840005050: Type: IMPLANTABLE DEVICE | Site: SPINE THORACIC | Status: FUNCTIONAL

## 2022-11-07 DEVICE — IMP SCR MEDT 5.5/6.0MM SOLERA 5.0X40MM MA 55840005040: Type: IMPLANTABLE DEVICE | Site: SPINE THORACIC | Status: FUNCTIONAL

## 2022-11-07 DEVICE — IMP ROD MEDT SOLERA LINED 5.5X500MM CHR 1555200500: Type: IMPLANTABLE DEVICE | Site: SPINE THORACIC | Status: FUNCTIONAL

## 2022-11-07 DEVICE — IMP SCR SET MEDT SOLERA BREAK OFF 5.5MM TI 5540030: Type: IMPLANTABLE DEVICE | Site: SPINE THORACIC | Status: FUNCTIONAL

## 2022-11-07 DEVICE — IMP SCR MEDT 5.5/6.0MM SOLERA 5.0X35MM MA 55840005035: Type: IMPLANTABLE DEVICE | Site: SPINE THORACIC | Status: FUNCTIONAL

## 2022-11-07 DEVICE — IMP SCR MEDT 5.5/6.0MM SOLERA 5.0X45MM MA 55840005045: Type: IMPLANTABLE DEVICE | Site: SPINE THORACIC | Status: FUNCTIONAL

## 2022-11-07 DEVICE — IMPLANTABLE DEVICE: Type: IMPLANTABLE DEVICE | Site: SPINE THORACIC | Status: FUNCTIONAL

## 2022-11-07 DEVICE — GRAFT BN CANC 30CC CRSH 1-10MM 800104: Type: IMPLANTABLE DEVICE | Site: SPINE THORACIC | Status: FUNCTIONAL

## 2022-11-07 RX ORDER — PROCHLORPERAZINE MALEATE 5 MG
5 TABLET ORAL EVERY 6 HOURS PRN
Status: DISCONTINUED | OUTPATIENT
Start: 2022-11-07 | End: 2022-11-11 | Stop reason: HOSPADM

## 2022-11-07 RX ORDER — NALOXONE HYDROCHLORIDE 0.4 MG/ML
0.4 INJECTION, SOLUTION INTRAMUSCULAR; INTRAVENOUS; SUBCUTANEOUS
Status: DISCONTINUED | OUTPATIENT
Start: 2022-11-07 | End: 2022-11-11 | Stop reason: HOSPADM

## 2022-11-07 RX ORDER — NICOTINE 21 MG/24HR
1 PATCH, TRANSDERMAL 24 HOURS TRANSDERMAL EVERY 24 HOURS
COMMUNITY

## 2022-11-07 RX ORDER — HYDROMORPHONE HCL IN WATER/PF 6 MG/30 ML
0.2 PATIENT CONTROLLED ANALGESIA SYRINGE INTRAVENOUS EVERY 5 MIN PRN
Status: DISCONTINUED | OUTPATIENT
Start: 2022-11-07 | End: 2022-11-07 | Stop reason: HOSPADM

## 2022-11-07 RX ORDER — SODIUM CHLORIDE, SODIUM LACTATE, POTASSIUM CHLORIDE, CALCIUM CHLORIDE 600; 310; 30; 20 MG/100ML; MG/100ML; MG/100ML; MG/100ML
INJECTION, SOLUTION INTRAVENOUS CONTINUOUS
Status: DISCONTINUED | OUTPATIENT
Start: 2022-11-07 | End: 2022-11-07 | Stop reason: HOSPADM

## 2022-11-07 RX ORDER — PROPOFOL 10 MG/ML
INJECTION, EMULSION INTRAVENOUS PRN
Status: DISCONTINUED | OUTPATIENT
Start: 2022-11-07 | End: 2022-11-07

## 2022-11-07 RX ORDER — IPRATROPIUM BROMIDE AND ALBUTEROL SULFATE 2.5; .5 MG/3ML; MG/3ML
3 SOLUTION RESPIRATORY (INHALATION) ONCE
Status: COMPLETED | OUTPATIENT
Start: 2022-11-07 | End: 2022-11-07

## 2022-11-07 RX ORDER — PROPOFOL 10 MG/ML
INJECTION, EMULSION INTRAVENOUS CONTINUOUS PRN
Status: DISCONTINUED | OUTPATIENT
Start: 2022-11-07 | End: 2022-11-07

## 2022-11-07 RX ORDER — NALOXONE HYDROCHLORIDE 0.4 MG/ML
0.2 INJECTION, SOLUTION INTRAMUSCULAR; INTRAVENOUS; SUBCUTANEOUS
Status: DISCONTINUED | OUTPATIENT
Start: 2022-11-07 | End: 2022-11-11 | Stop reason: HOSPADM

## 2022-11-07 RX ORDER — SODIUM CHLORIDE, SODIUM GLUCONATE, SODIUM ACETATE, POTASSIUM CHLORIDE AND MAGNESIUM CHLORIDE 526; 502; 368; 37; 30 MG/100ML; MG/100ML; MG/100ML; MG/100ML; MG/100ML
INJECTION, SOLUTION INTRAVENOUS CONTINUOUS PRN
Status: DISCONTINUED | OUTPATIENT
Start: 2022-11-07 | End: 2022-11-07

## 2022-11-07 RX ORDER — HYDROXYZINE HYDROCHLORIDE 25 MG/1
25 TABLET, FILM COATED ORAL EVERY 6 HOURS PRN
Status: DISCONTINUED | OUTPATIENT
Start: 2022-11-07 | End: 2022-11-11 | Stop reason: HOSPADM

## 2022-11-07 RX ORDER — DEXAMETHASONE SODIUM PHOSPHATE 4 MG/ML
INJECTION, SOLUTION INTRA-ARTICULAR; INTRALESIONAL; INTRAMUSCULAR; INTRAVENOUS; SOFT TISSUE PRN
Status: DISCONTINUED | OUTPATIENT
Start: 2022-11-07 | End: 2022-11-07

## 2022-11-07 RX ORDER — MAGNESIUM HYDROXIDE 1200 MG/15ML
LIQUID ORAL PRN
Status: DISCONTINUED | OUTPATIENT
Start: 2022-11-07 | End: 2022-11-07 | Stop reason: HOSPADM

## 2022-11-07 RX ORDER — FENTANYL CITRATE 50 UG/ML
INJECTION, SOLUTION INTRAMUSCULAR; INTRAVENOUS PRN
Status: DISCONTINUED | OUTPATIENT
Start: 2022-11-07 | End: 2022-11-07

## 2022-11-07 RX ORDER — MAGNESIUM SULFATE HEPTAHYDRATE 40 MG/ML
2 INJECTION, SOLUTION INTRAVENOUS ONCE
Status: COMPLETED | OUTPATIENT
Start: 2022-11-07 | End: 2022-11-07

## 2022-11-07 RX ORDER — FENTANYL CITRATE 50 UG/ML
25 INJECTION, SOLUTION INTRAMUSCULAR; INTRAVENOUS EVERY 5 MIN PRN
Status: DISCONTINUED | OUTPATIENT
Start: 2022-11-07 | End: 2022-11-07 | Stop reason: HOSPADM

## 2022-11-07 RX ORDER — CALCIUM CARBONATE 500 MG/1
500-1000 TABLET, CHEWABLE ORAL 3 TIMES DAILY PRN
Status: DISCONTINUED | OUTPATIENT
Start: 2022-11-07 | End: 2022-11-11 | Stop reason: HOSPADM

## 2022-11-07 RX ORDER — SODIUM CHLORIDE, SODIUM LACTATE, POTASSIUM CHLORIDE, CALCIUM CHLORIDE 600; 310; 30; 20 MG/100ML; MG/100ML; MG/100ML; MG/100ML
INJECTION, SOLUTION INTRAVENOUS CONTINUOUS PRN
Status: DISCONTINUED | OUTPATIENT
Start: 2022-11-07 | End: 2022-11-07

## 2022-11-07 RX ORDER — CALCIUM CARBONATE 500 MG/1
500 TABLET, CHEWABLE ORAL 3 TIMES DAILY PRN
Status: DISCONTINUED | OUTPATIENT
Start: 2022-11-07 | End: 2022-11-07

## 2022-11-07 RX ORDER — PROCHLORPERAZINE 25 MG
25 SUPPOSITORY, RECTAL RECTAL EVERY 12 HOURS PRN
Status: DISCONTINUED | OUTPATIENT
Start: 2022-11-07 | End: 2022-11-11 | Stop reason: HOSPADM

## 2022-11-07 RX ORDER — ACETAMINOPHEN 325 MG/1
975 TABLET ORAL ONCE
Status: COMPLETED | OUTPATIENT
Start: 2022-11-07 | End: 2022-11-07

## 2022-11-07 RX ORDER — METHOCARBAMOL 750 MG/1
750 TABLET, FILM COATED ORAL EVERY 6 HOURS PRN
Status: DISCONTINUED | OUTPATIENT
Start: 2022-11-07 | End: 2022-11-11 | Stop reason: HOSPADM

## 2022-11-07 RX ORDER — SODIUM CHLORIDE 9 MG/ML
INJECTION, SOLUTION INTRAVENOUS
Status: COMPLETED
Start: 2022-11-07 | End: 2022-11-07

## 2022-11-07 RX ORDER — VANCOMYCIN HYDROCHLORIDE 1 G/20ML
INJECTION, POWDER, LYOPHILIZED, FOR SOLUTION INTRAVENOUS PRN
Status: DISCONTINUED | OUTPATIENT
Start: 2022-11-07 | End: 2022-11-07 | Stop reason: HOSPADM

## 2022-11-07 RX ORDER — ALBUTEROL SULFATE 0.83 MG/ML
2.5 SOLUTION RESPIRATORY (INHALATION) EVERY 6 HOURS PRN
Status: DISCONTINUED | OUTPATIENT
Start: 2022-11-07 | End: 2022-11-11 | Stop reason: HOSPADM

## 2022-11-07 RX ORDER — CALCIUM GLUCONATE 20 MG/ML
1 INJECTION, SOLUTION INTRAVENOUS ONCE
Status: COMPLETED | OUTPATIENT
Start: 2022-11-07 | End: 2022-11-07

## 2022-11-07 RX ORDER — ONDANSETRON 2 MG/ML
INJECTION INTRAMUSCULAR; INTRAVENOUS PRN
Status: DISCONTINUED | OUTPATIENT
Start: 2022-11-07 | End: 2022-11-07

## 2022-11-07 RX ORDER — EPHEDRINE SULFATE 50 MG/ML
INJECTION, SOLUTION INTRAMUSCULAR; INTRAVENOUS; SUBCUTANEOUS PRN
Status: DISCONTINUED | OUTPATIENT
Start: 2022-11-07 | End: 2022-11-07

## 2022-11-07 RX ORDER — LIDOCAINE 40 MG/G
CREAM TOPICAL
Status: DISCONTINUED | OUTPATIENT
Start: 2022-11-07 | End: 2022-11-07 | Stop reason: HOSPADM

## 2022-11-07 RX ORDER — CEFAZOLIN SODIUM/WATER 2 G/20 ML
2 SYRINGE (ML) INTRAVENOUS
Status: COMPLETED | OUTPATIENT
Start: 2022-11-07 | End: 2022-11-07

## 2022-11-07 RX ORDER — NICOTINE 21 MG/24HR
1 PATCH, TRANSDERMAL 24 HOURS TRANSDERMAL EVERY 24 HOURS
Status: DISCONTINUED | OUTPATIENT
Start: 2022-11-07 | End: 2022-11-11 | Stop reason: HOSPADM

## 2022-11-07 RX ORDER — LIDOCAINE HYDROCHLORIDE 20 MG/ML
INJECTION, SOLUTION INFILTRATION; PERINEURAL PRN
Status: DISCONTINUED | OUTPATIENT
Start: 2022-11-07 | End: 2022-11-07

## 2022-11-07 RX ORDER — ACETAMINOPHEN 325 MG/1
325 TABLET ORAL EVERY 4 HOURS PRN
Status: DISCONTINUED | OUTPATIENT
Start: 2022-11-07 | End: 2022-11-10

## 2022-11-07 RX ORDER — LIDOCAINE 40 MG/G
CREAM TOPICAL
Status: DISCONTINUED | OUTPATIENT
Start: 2022-11-07 | End: 2022-11-11 | Stop reason: HOSPADM

## 2022-11-07 RX ORDER — SODIUM CHLORIDE 9 MG/ML
INJECTION, SOLUTION INTRAVENOUS CONTINUOUS
Status: DISCONTINUED | OUTPATIENT
Start: 2022-11-07 | End: 2022-11-11

## 2022-11-07 RX ORDER — DEXAMETHASONE SODIUM PHOSPHATE 4 MG/ML
4 INJECTION, SOLUTION INTRA-ARTICULAR; INTRALESIONAL; INTRAMUSCULAR; INTRAVENOUS; SOFT TISSUE
Status: DISCONTINUED | OUTPATIENT
Start: 2022-11-07 | End: 2022-11-07 | Stop reason: HOSPADM

## 2022-11-07 RX ORDER — HYDROMORPHONE HCL IN WATER/PF 6 MG/30 ML
.2-.4 PATIENT CONTROLLED ANALGESIA SYRINGE INTRAVENOUS
Status: DISCONTINUED | OUTPATIENT
Start: 2022-11-07 | End: 2022-11-07

## 2022-11-07 RX ORDER — LABETALOL HYDROCHLORIDE 5 MG/ML
10 INJECTION, SOLUTION INTRAVENOUS
Status: DISCONTINUED | OUTPATIENT
Start: 2022-11-07 | End: 2022-11-07 | Stop reason: HOSPADM

## 2022-11-07 RX ORDER — FLUOXETINE 10 MG/1
10 CAPSULE ORAL AT BEDTIME
Status: DISCONTINUED | OUTPATIENT
Start: 2022-11-07 | End: 2022-11-11 | Stop reason: HOSPADM

## 2022-11-07 RX ORDER — HYDROXYZINE HYDROCHLORIDE 25 MG/1
50 TABLET, FILM COATED ORAL EVERY 6 HOURS PRN
Status: DISCONTINUED | OUTPATIENT
Start: 2022-11-07 | End: 2022-11-11 | Stop reason: HOSPADM

## 2022-11-07 RX ORDER — CEFAZOLIN SODIUM 1 G/3ML
1 INJECTION, POWDER, FOR SOLUTION INTRAMUSCULAR; INTRAVENOUS EVERY 8 HOURS
Status: COMPLETED | OUTPATIENT
Start: 2022-11-07 | End: 2022-11-08

## 2022-11-07 RX ORDER — DIPHENHYDRAMINE HYDROCHLORIDE 50 MG/ML
25 INJECTION INTRAMUSCULAR; INTRAVENOUS EVERY 6 HOURS PRN
Status: DISCONTINUED | OUTPATIENT
Start: 2022-11-07 | End: 2022-11-07 | Stop reason: HOSPADM

## 2022-11-07 RX ORDER — FLUTICASONE FUROATE AND VILANTEROL 100; 25 UG/1; UG/1
1 POWDER RESPIRATORY (INHALATION) DAILY
Status: DISCONTINUED | OUTPATIENT
Start: 2022-11-07 | End: 2022-11-11 | Stop reason: HOSPADM

## 2022-11-07 RX ORDER — DIMENHYDRINATE 50 MG/ML
25 INJECTION, SOLUTION INTRAMUSCULAR; INTRAVENOUS
Status: DISCONTINUED | OUTPATIENT
Start: 2022-11-07 | End: 2022-11-07 | Stop reason: HOSPADM

## 2022-11-07 RX ORDER — BUPIVACAINE HYDROCHLORIDE AND EPINEPHRINE 2.5; 5 MG/ML; UG/ML
INJECTION, SOLUTION INFILTRATION; PERINEURAL PRN
Status: DISCONTINUED | OUTPATIENT
Start: 2022-11-07 | End: 2022-11-07 | Stop reason: HOSPADM

## 2022-11-07 RX ORDER — DIPHENHYDRAMINE HCL 25 MG
25 CAPSULE ORAL EVERY 6 HOURS PRN
Status: DISCONTINUED | OUTPATIENT
Start: 2022-11-07 | End: 2022-11-07 | Stop reason: HOSPADM

## 2022-11-07 RX ORDER — ACETAMINOPHEN 500 MG
500 TABLET ORAL EVERY 6 HOURS PRN
Status: ON HOLD | COMMUNITY
End: 2022-11-10

## 2022-11-07 RX ORDER — GABAPENTIN 100 MG/1
300 CAPSULE ORAL
Status: COMPLETED | OUTPATIENT
Start: 2022-11-07 | End: 2022-11-07

## 2022-11-07 RX ORDER — ONDANSETRON 2 MG/ML
4 INJECTION INTRAMUSCULAR; INTRAVENOUS EVERY 30 MIN PRN
Status: DISCONTINUED | OUTPATIENT
Start: 2022-11-07 | End: 2022-11-07 | Stop reason: HOSPADM

## 2022-11-07 RX ORDER — CEFAZOLIN SODIUM/WATER 2 G/20 ML
2 SYRINGE (ML) INTRAVENOUS SEE ADMIN INSTRUCTIONS
Status: DISCONTINUED | OUTPATIENT
Start: 2022-11-07 | End: 2022-11-07 | Stop reason: HOSPADM

## 2022-11-07 RX ORDER — ONDANSETRON 2 MG/ML
4 INJECTION INTRAMUSCULAR; INTRAVENOUS EVERY 6 HOURS PRN
Status: DISCONTINUED | OUTPATIENT
Start: 2022-11-07 | End: 2022-11-11 | Stop reason: HOSPADM

## 2022-11-07 RX ORDER — ONDANSETRON 4 MG/1
4 TABLET, ORALLY DISINTEGRATING ORAL EVERY 30 MIN PRN
Status: DISCONTINUED | OUTPATIENT
Start: 2022-11-07 | End: 2022-11-07 | Stop reason: HOSPADM

## 2022-11-07 RX ORDER — OXYCODONE HYDROCHLORIDE 5 MG/1
5-10 TABLET ORAL EVERY 4 HOURS PRN
Status: DISCONTINUED | OUTPATIENT
Start: 2022-11-07 | End: 2022-11-08

## 2022-11-07 RX ADMIN — PHENYLEPHRINE HYDROCHLORIDE 50 MCG: 10 INJECTION INTRAVENOUS at 08:04

## 2022-11-07 RX ADMIN — SODIUM CHLORIDE, POTASSIUM CHLORIDE, SODIUM LACTATE AND CALCIUM CHLORIDE: 600; 310; 30; 20 INJECTION, SOLUTION INTRAVENOUS at 06:54

## 2022-11-07 RX ADMIN — FENTANYL CITRATE 50 MCG: 50 INJECTION, SOLUTION INTRAMUSCULAR; INTRAVENOUS at 13:23

## 2022-11-07 RX ADMIN — KETAMINE HYDROCHLORIDE 10 MG/HR: 100 INJECTION, SOLUTION, CONCENTRATE INTRAMUSCULAR; INTRAVENOUS at 16:22

## 2022-11-07 RX ADMIN — OXYCODONE HYDROCHLORIDE 10 MG: 5 TABLET ORAL at 19:39

## 2022-11-07 RX ADMIN — HYDROMORPHONE HYDROCHLORIDE 0.2 MG: 0.2 INJECTION, SOLUTION INTRAMUSCULAR; INTRAVENOUS; SUBCUTANEOUS at 14:37

## 2022-11-07 RX ADMIN — HYDROMORPHONE HYDROCHLORIDE 0.5 MG: 1 INJECTION, SOLUTION INTRAMUSCULAR; INTRAVENOUS; SUBCUTANEOUS at 13:26

## 2022-11-07 RX ADMIN — SODIUM CHLORIDE: 9 INJECTION, SOLUTION INTRAVENOUS at 19:02

## 2022-11-07 RX ADMIN — CALCIUM GLUCONATE 1 G: 20 INJECTION, SOLUTION INTRAVENOUS at 15:35

## 2022-11-07 RX ADMIN — Medication 5 MG: at 12:52

## 2022-11-07 RX ADMIN — IPRATROPIUM BROMIDE AND ALBUTEROL SULFATE 3 ML: 2.5; .5 SOLUTION RESPIRATORY (INHALATION) at 07:21

## 2022-11-07 RX ADMIN — FENTANYL CITRATE 100 MCG: 50 INJECTION, SOLUTION INTRAMUSCULAR; INTRAVENOUS at 07:42

## 2022-11-07 RX ADMIN — KETAMINE HYDROCHLORIDE 10 MCG/KG/MIN: 100 INJECTION, SOLUTION, CONCENTRATE INTRAMUSCULAR; INTRAVENOUS at 08:09

## 2022-11-07 RX ADMIN — HYDROXYZINE HYDROCHLORIDE 50 MG: 25 TABLET, FILM COATED ORAL at 22:42

## 2022-11-07 RX ADMIN — LIDOCAINE HYDROCHLORIDE 60 MG: 20 INJECTION, SOLUTION INFILTRATION; PERINEURAL at 07:46

## 2022-11-07 RX ADMIN — PHENYLEPHRINE HYDROCHLORIDE 100 MCG: 10 INJECTION INTRAVENOUS at 13:33

## 2022-11-07 RX ADMIN — SODIUM CHLORIDE, SODIUM GLUCONATE, SODIUM ACETATE, POTASSIUM CHLORIDE AND MAGNESIUM CHLORIDE: 526; 502; 368; 37; 30 INJECTION, SOLUTION INTRAVENOUS at 12:23

## 2022-11-07 RX ADMIN — PROPOFOL 50 MG: 10 INJECTION, EMULSION INTRAVENOUS at 07:53

## 2022-11-07 RX ADMIN — Medication 2 G: at 12:00

## 2022-11-07 RX ADMIN — PHENYLEPHRINE HYDROCHLORIDE 50 MCG: 10 INJECTION INTRAVENOUS at 12:00

## 2022-11-07 RX ADMIN — ACETAMINOPHEN 975 MG: 325 TABLET, FILM COATED ORAL at 17:06

## 2022-11-07 RX ADMIN — PHENYLEPHRINE HYDROCHLORIDE 0.4 MCG/KG/MIN: 10 INJECTION INTRAVENOUS at 13:45

## 2022-11-07 RX ADMIN — MIDAZOLAM 2 MG: 1 INJECTION INTRAMUSCULAR; INTRAVENOUS at 07:32

## 2022-11-07 RX ADMIN — FENTANYL CITRATE 100 MCG: 50 INJECTION, SOLUTION INTRAMUSCULAR; INTRAVENOUS at 13:45

## 2022-11-07 RX ADMIN — HYDROMORPHONE HYDROCHLORIDE 0.2 MG: 0.2 INJECTION, SOLUTION INTRAMUSCULAR; INTRAVENOUS; SUBCUTANEOUS at 16:16

## 2022-11-07 RX ADMIN — PHENYLEPHRINE HYDROCHLORIDE 100 MCG: 10 INJECTION INTRAVENOUS at 12:58

## 2022-11-07 RX ADMIN — Medication 5 MG: at 09:15

## 2022-11-07 RX ADMIN — PROPOFOL 125 MCG/KG/MIN: 10 INJECTION, EMULSION INTRAVENOUS at 09:53

## 2022-11-07 RX ADMIN — FLUOXETINE HYDROCHLORIDE 10 MG: 10 CAPSULE ORAL at 22:17

## 2022-11-07 RX ADMIN — SODIUM CHLORIDE, SODIUM GLUCONATE, SODIUM ACETATE, POTASSIUM CHLORIDE AND MAGNESIUM CHLORIDE: 526; 502; 368; 37; 30 INJECTION, SOLUTION INTRAVENOUS at 08:17

## 2022-11-07 RX ADMIN — ONDANSETRON 4 MG: 2 INJECTION INTRAMUSCULAR; INTRAVENOUS at 20:52

## 2022-11-07 RX ADMIN — OXYCODONE HYDROCHLORIDE 10 MG: 5 TABLET ORAL at 23:50

## 2022-11-07 RX ADMIN — PHENYLEPHRINE HYDROCHLORIDE 100 MCG: 10 INJECTION INTRAVENOUS at 12:06

## 2022-11-07 RX ADMIN — ONDANSETRON 4 MG: 2 INJECTION INTRAMUSCULAR; INTRAVENOUS at 13:09

## 2022-11-07 RX ADMIN — CEFAZOLIN 1 G: 1 INJECTION, POWDER, FOR SOLUTION INTRAMUSCULAR; INTRAVENOUS at 19:43

## 2022-11-07 RX ADMIN — PHENYLEPHRINE HYDROCHLORIDE 50 MCG: 10 INJECTION INTRAVENOUS at 10:24

## 2022-11-07 RX ADMIN — DEXAMETHASONE SODIUM PHOSPHATE 10 MG: 4 INJECTION, SOLUTION INTRA-ARTICULAR; INTRALESIONAL; INTRAMUSCULAR; INTRAVENOUS; SOFT TISSUE at 08:05

## 2022-11-07 RX ADMIN — SODIUM CHLORIDE, SODIUM GLUCONATE, SODIUM ACETATE, POTASSIUM CHLORIDE AND MAGNESIUM CHLORIDE: 526; 502; 368; 37; 30 INJECTION, SOLUTION INTRAVENOUS at 11:01

## 2022-11-07 RX ADMIN — SODIUM CHLORIDE, POTASSIUM CHLORIDE, SODIUM LACTATE AND CALCIUM CHLORIDE 500 ML: 600; 310; 30; 20 INJECTION, SOLUTION INTRAVENOUS at 14:58

## 2022-11-07 RX ADMIN — PHENYLEPHRINE HYDROCHLORIDE 100 MCG: 10 INJECTION INTRAVENOUS at 13:40

## 2022-11-07 RX ADMIN — Medication 5 MG: at 13:29

## 2022-11-07 RX ADMIN — FENTANYL CITRATE 50 MCG: 50 INJECTION, SOLUTION INTRAMUSCULAR; INTRAVENOUS at 13:21

## 2022-11-07 RX ADMIN — SODIUM CHLORIDE, POTASSIUM CHLORIDE, SODIUM LACTATE AND CALCIUM CHLORIDE: 600; 310; 30; 20 INJECTION, SOLUTION INTRAVENOUS at 08:11

## 2022-11-07 RX ADMIN — TRANEXAMIC ACID 5 MG/KG/HR: 100 INJECTION INTRAVENOUS at 08:43

## 2022-11-07 RX ADMIN — HYDROMORPHONE HYDROCHLORIDE 0.5 MG: 1 INJECTION, SOLUTION INTRAMUSCULAR; INTRAVENOUS; SUBCUTANEOUS at 13:20

## 2022-11-07 RX ADMIN — ONDANSETRON 4 MG: 2 INJECTION INTRAMUSCULAR; INTRAVENOUS at 15:51

## 2022-11-07 RX ADMIN — PHENYLEPHRINE HYDROCHLORIDE 100 MCG: 10 INJECTION INTRAVENOUS at 12:11

## 2022-11-07 RX ADMIN — PHENYLEPHRINE HYDROCHLORIDE 50 MCG: 10 INJECTION INTRAVENOUS at 11:59

## 2022-11-07 RX ADMIN — PHENYLEPHRINE HYDROCHLORIDE 50 MCG: 10 INJECTION INTRAVENOUS at 11:19

## 2022-11-07 RX ADMIN — PHENYLEPHRINE HYDROCHLORIDE 100 MCG: 10 INJECTION INTRAVENOUS at 12:52

## 2022-11-07 RX ADMIN — Medication 20 MG: at 08:30

## 2022-11-07 RX ADMIN — PHENYLEPHRINE HYDROCHLORIDE 0.2 MCG/KG/MIN: 10 INJECTION INTRAVENOUS at 08:19

## 2022-11-07 RX ADMIN — METHOCARBAMOL 750 MG: 750 TABLET ORAL at 22:17

## 2022-11-07 RX ADMIN — ACETAMINOPHEN 975 MG: 325 TABLET, FILM COATED ORAL at 07:20

## 2022-11-07 RX ADMIN — PHENYLEPHRINE HYDROCHLORIDE 50 MCG: 10 INJECTION INTRAVENOUS at 10:50

## 2022-11-07 RX ADMIN — MAGNESIUM SULFATE 2 G: 2 INJECTION INTRAVENOUS at 08:29

## 2022-11-07 RX ADMIN — Medication 2 G: at 08:00

## 2022-11-07 RX ADMIN — PHENYLEPHRINE HYDROCHLORIDE 100 MCG: 10 INJECTION INTRAVENOUS at 13:37

## 2022-11-07 RX ADMIN — SODIUM CHLORIDE: 9 INJECTION, SOLUTION INTRAVENOUS at 18:55

## 2022-11-07 RX ADMIN — TRANEXAMIC ACID 660 MG: 100 INJECTION INTRAVENOUS at 08:09

## 2022-11-07 RX ADMIN — FAMOTIDINE 20 MG: 10 INJECTION INTRAVENOUS at 21:45

## 2022-11-07 RX ADMIN — Medication 5 MG: at 11:02

## 2022-11-07 RX ADMIN — METHOCARBAMOL 750 MG: 750 TABLET ORAL at 16:07

## 2022-11-07 RX ADMIN — PHENYLEPHRINE HYDROCHLORIDE 100 MCG: 10 INJECTION INTRAVENOUS at 12:40

## 2022-11-07 RX ADMIN — HYDROMORPHONE HYDROCHLORIDE 0.2 MG: 0.2 INJECTION, SOLUTION INTRAMUSCULAR; INTRAVENOUS; SUBCUTANEOUS at 15:44

## 2022-11-07 RX ADMIN — SODIUM CHLORIDE, POTASSIUM CHLORIDE, SODIUM LACTATE AND CALCIUM CHLORIDE 500 ML: 600; 310; 30; 20 INJECTION, SOLUTION INTRAVENOUS at 21:44

## 2022-11-07 RX ADMIN — GABAPENTIN 300 MG: 300 CAPSULE ORAL at 07:20

## 2022-11-07 RX ADMIN — SODIUM CHLORIDE 500 ML: 9 INJECTION, SOLUTION INTRAVENOUS at 19:50

## 2022-11-07 RX ADMIN — Medication 5 MG: at 08:04

## 2022-11-07 RX ADMIN — HYDROMORPHONE HYDROCHLORIDE 0.4 MG: 0.2 INJECTION, SOLUTION INTRAMUSCULAR; INTRAVENOUS; SUBCUTANEOUS at 20:52

## 2022-11-07 RX ADMIN — PROPOFOL 150 MG: 10 INJECTION, EMULSION INTRAVENOUS at 07:46

## 2022-11-07 RX ADMIN — SUCCINYLCHOLINE CHLORIDE 60 MG: 20 INJECTION, SOLUTION INTRAMUSCULAR; INTRAVENOUS; PARENTERAL at 07:47

## 2022-11-07 RX ADMIN — PROPOFOL 100 MCG/KG/MIN: 10 INJECTION, EMULSION INTRAVENOUS at 08:09

## 2022-11-07 RX ADMIN — NICOTINE 1 PATCH: 21 PATCH, EXTENDED RELEASE TRANSDERMAL at 21:03

## 2022-11-07 RX ADMIN — CALCIUM CARBONATE (ANTACID) CHEW TAB 500 MG 1000 MG: 500 CHEW TAB at 21:44

## 2022-11-07 RX ADMIN — PHENYLEPHRINE HYDROCHLORIDE 100 MCG: 10 INJECTION INTRAVENOUS at 12:31

## 2022-11-07 RX ADMIN — PHENYLEPHRINE HYDROCHLORIDE 50 MCG: 10 INJECTION INTRAVENOUS at 08:39

## 2022-11-07 RX ADMIN — FENTANYL CITRATE 1 MCG/KG/HR: 50 INJECTION, SOLUTION INTRAMUSCULAR; INTRAVENOUS at 08:09

## 2022-11-07 RX ADMIN — SUGAMMADEX 120 MG: 100 INJECTION, SOLUTION INTRAVENOUS at 09:47

## 2022-11-07 RX ADMIN — PHENYLEPHRINE HYDROCHLORIDE 100 MCG: 10 INJECTION INTRAVENOUS at 12:43

## 2022-11-07 ASSESSMENT — ACTIVITIES OF DAILY LIVING (ADL)
ADLS_ACUITY_SCORE: 19
ADLS_ACUITY_SCORE: 18
ADLS_ACUITY_SCORE: 19

## 2022-11-07 ASSESSMENT — LIFESTYLE VARIABLES: TOBACCO_USE: 1

## 2022-11-07 NOTE — ANESTHESIA CARE TRANSFER NOTE
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    Anesthesia Type:   General     Note:    Oropharynx: oropharynx clear of all foreign objects and spontaneously breathing  Level of Consciousness: awake  Oxygen Supplementation: face mask  Level of Supplemental Oxygen (L/min / FiO2): 8  Independent Airway: airway patency satisfactory and stable  Dentition: dentition unchanged  Vital Signs Stable: post-procedure vital signs reviewed and stable  Report to RN Given: handoff report given  Patient transferred to: PACU  Comments: Pt awake and oriented. Blood pressures soft, ephedrine and phenylephrine bolus given and restarted on phenylephrine drip. Blood pressure stabilized, VSS. Report given to RN, all questions answered.  Handoff Report: Identifed the Patient, Identified the Reponsible Provider, Reviewed the pertinent medical history, Discussed the surgical course, Reviewed Intra-OP anesthesia mangement and issues during anesthesia, Set expectations for post-procedure period and Allowed opportunity for questions and acknowledgement of understanding      Vitals:  Vitals Value Taken Time   BP 67/48 11/07/22 1330   Temp 36.6  C (97.9  F) 11/07/22 1324   Pulse 90 11/07/22 1346   Resp 11 11/07/22 1346   SpO2 100 % 11/07/22 1346   Vitals shown include unvalidated device data.    Electronically Signed By: JUAN Carver CRNA  November 7, 2022  1:48 PM

## 2022-11-07 NOTE — OR NURSING
BP stable with MAPs 65-70's since 1630. Dr. Pulido updated. Sign out received. Per Dr. Pulido, OK to remove arterial line and transfer patient to floor.

## 2022-11-07 NOTE — OR NURSING
BP's soft upon arrival to PACU, 60s-70s/30s-40s. KATIE Madsen at bedside during handoff. eohedrine and phenylephrine boluses given by CRNA. BP's continue to be soft, Dr. Pulido at bedside. Orders received to start phenylephrine gtt, started at 0.4mcg/kg/min. Will continue to monitor.

## 2022-11-07 NOTE — ANESTHESIA PROCEDURE NOTES
Airway       Patient location during procedure: OR       Procedure Start/Stop Times: 11/7/2022 7:48 AM  Staff -        Anesthesiologist:  Tuan Mcleod DO       CRNA: Tena Cochran APRN CRNA       Performed By: CRNA  Consent for Airway        Urgency: elective  Indications and Patient Condition       Indications for airway management: grace-procedural       Induction type:intravenous       Mask difficulty assessment: 1 - vent by mask    Final Airway Details       Final airway type: endotracheal airway       Successful airway: ETT - single and Oral  Endotracheal Airway Details        ETT size (mm): 7.0       Cuffed: yes       Successful intubation technique: direct laryngoscopy       DL Blade Type: Hamilton 2       Grade View of Cords: 1       Adjucts: stylet       Position: Center       Measured from: gums/teeth       Secured at (cm): 21       Bite block used: Soft    Post intubation assessment        Placement verified by: capnometry, equal breath sounds and chest rise        Number of attempts at approach: 1       Secured with: silk tape       Ease of procedure: easy       Dentition: Intact and Unchanged    Medication(s) Administered   Medication Administration Time: 11/7/2022 7:48 AM

## 2022-11-07 NOTE — OR NURSING
PACU to Inpatient Nursing Handoff    Patient mEeli Beyer is a 57 year old female who speaks English.   Procedure Procedure(s):  Extended Pedicle Subtraction Osteotomy Thoracic 7 with Expandable Cage Reconstruction, Posterior Spinal Fusion Thoracic 5 to Thoracic 9   Surgeon(s) Primary: Evaristo Allan MD  Resident - Assisting: Moe Juan MD     Allergies   Allergen Reactions     Penicillins Rash and Shortness Of Breath     Penicillins     Clindamycin Rash     Naproxen Rash     Anaprox TABS       Isolation  None    Past Medical History   has a past medical history of Asthma and HTN (hypertension).    Anesthesia General   Dermatome Level     Preop Meds acetaminophen (Tylenol) - time given: 0720  gabapentin (Neurontin) - time given: 0720  Duoneb - time given: 0721   Nerve block Not applicable   Intraop Meds dexamethasone (Decadron)  fentanyl (Sublimaze): 464 mcg total  hydromorphone (Dilaudid): 1 mg total  ketamine (Ketalar): 205 mg given  ondansetron (Zofran): last given at 1309   Local Meds Yes   Antibiotics cefazolin (Ancef) - last given at 1200     Pain Patient Currently in Pain: yes   PACU meds  acetaminophen (Tylenol): 975 mg (total dose) last given at 1707   hydromorphone (Dilaudid): 0.6 mg (total dose) last given at 1616   ondansetron (Zofran): 4 mg (total dose) last given at 1551   ketamine gtt infusing at 5mg/hr, 500cc LR bolus, robaxin 750mg @ 1607, 1g Calcium gluconate at 1535   PCA / epidural No   Capnography     Telemetry ECG Rhythm: Sinus rhythm   Inpatient Telemetry Monitor Ordered? No        Labs Glucose Lab Results   Component Value Date     11/07/2022    GLC 98 11/07/2022       Hgb Lab Results   Component Value Date    HGB 8.6 11/07/2022    HGB 9.5 11/07/2022       INR No results found for: INR   PACU Imaging Not applicable     Wound/Incision Incision/Surgical Site 11/07/22 Midline Thoracic spine (Active)   Incision Assessment UTV 11/07/22 1630   Laverne-Incision  Assessment UTV 11/07/22 1430   Closure MICHAEL 11/07/22 1630   Incision Drainage Amount UTV 11/07/22 1630   Dressing Intervention Clean, dry, intact 11/07/22 1630   Number of days: 0      CMS        Equipment ice pack   Other LDA       IV Access Peripheral IV 11/07/22 Left Lower forearm (Active)   Site Assessment WDL 11/07/22 1430   Line Status Infusing 11/07/22 1430   Phlebitis Scale 0-->no symptoms 11/07/22 1430   Infiltration Scale 0 11/07/22 1430   Number of days: 0       Peripheral IV 11/07/22 Left Wrist (Active)   Site Assessment WDL 11/07/22 1430   Line Status Infusing 11/07/22 1430   Phlebitis Scale 0-->no symptoms 11/07/22 1430   Infiltration Scale 0 11/07/22 1430   Number of days: 0       Arterial Line 11/07/22 Radial (Active)   Site Assessment WDL 11/07/22 1430   Line Status Pulsatile blood flow 11/07/22 1430   Art Line Waveform Appropriate 11/07/22 1430   Art Line Interventions Leveled;Zeroed and calibrated;Flushed per protocol 11/07/22 1430   Color/Movement/Sensation Capillary refill less than 3 sec 11/07/22 1430   Line Necessity Yes, meets criteria 11/07/22 1430   Dressing Type Transparent 11/07/22 1430   Dressing Status Clean, dry, intact 11/07/22 1430   Number of days: 0      Blood Products Not applicable EBL 1250 mL   Intake/Output Date 11/07/22 0700 - 11/08/22 0659   Shift 6704-8369 1143-7205 5163-6188 24 Hour Total   INTAKE   I.V. 4498.33   4498.33   Shift Total(mL/kg) 4498.33(67.85)   4498.33(67.85)   OUTPUT   Urine 300   300   Blood 1250   1250   Shift Total(mL/kg) 1550(23.38)   1550(23.38)   Weight (kg) 66.3 66.3 66.3 66.3      Drains / Olson Closed/Suction Drain 1 Right;Midline Back Accordion 10 Danish placed to the upper right of midline incision - DEEP (Active)   Site Description UTV 11/07/22 1630   Dressing Status Normal: Clean, Dry & Intact 11/07/22 1630   Drainage Appearance Normal;Bloody/Bright Red 11/07/22 1630   Output (ml) 130 ml 11/07/22 1630   Number of days: 0       Urethral Catheter  11/07/22 Non-latex;Straight-tip 16 fr (Active)   Tube Description Positional 11/07/22 1330   Collection Container Standard 11/07/22 1630   Securement Method Securing device (Describe) 11/07/22 1630   Rationale for Continued Use Strict 1-2 Hour I&O 11/07/22 1630   Urine Output 250 mL 11/07/22 1530   Number of days: 0      Time of void PreOp Void Prior to Procedure: 0716 (11/07/22 0714)    PostOp      Diapered? No   Bladder Scan      mL (11/07/22 1630)  water     Vitals    B/P: 90/61  T: 98  F (36.7  C)    Temp src: Axillary  P:  Pulse: 76 (11/07/22 1700)          R: 15  O2:  SpO2: 99 %    O2 Device: None (Room air) (11/07/22 1700)    Oxygen Delivery: 3 LPM (11/07/22 1645)         Family/support present significant other   Patient belongings  with patient   Patient transported on cart   DC meds/scripts (obs/outpt) Not applicable   Inpatient Pain Meds Released? Yes       Special needs/considerations None   Tasks needing completion None       Luli Dodge, KEVIN  ASCOM [id number]

## 2022-11-07 NOTE — ANESTHESIA POSTPROCEDURE EVALUATION
Patient: Emeli Beyer    Procedure: Procedure(s):  Extended Pedicle Subtraction Osteotomy Thoracic 7 with Expandable Cage Reconstruction, Posterior Spinal Fusion Thoracic 5 to Thoracic 9       Anesthesia Type:  General    Note:  Disposition: Inpatient   Postop Pain Control: Uneventful            Sign Out: Pain at Preoperative BASELINE   PONV: No   Neuro/Psych: Uneventful            Sign Out: Acceptable/Baseline neuro status   Airway/Respiratory: Uneventful            Sign Out: Acceptable/Baseline resp. status   CV/Hemodynamics: Uneventful            Sign Out: Acceptable CV status; No obvious hypovolemia; No obvious fluid overload   Other NRE: NONE   DID A NON-ROUTINE EVENT OCCUR? No           Last vitals:  Vitals Value Taken Time   BP 90/61 11/07/22 1700   Temp 36.7  C (98  F) 11/07/22 1530   Pulse 83 11/07/22 1711   Resp 11 11/07/22 1711   SpO2 96 % 11/07/22 1711   Vitals shown include unvalidated device data.      Electronically Signed By: Ann-Marie Pulido MD  November 7, 2022  5:12 PM

## 2022-11-07 NOTE — BRIEF OP NOTE
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 Item Serial No.  Lot No. LRB No. Used Action   IMP SCR MEDT 5.5/6.0MM SOLERA 5.0X35MM MA [id number] - [id number] Metallic Hardware/[place] IMP SCR MEDT 5.5/6.0MM SOLERA 5.0X35MM MA [id number]  [employer]  N/A 1 Implanted   IMP SCR MEDT 5.5/6.0MM SOLERA 5.0X40MM MA [id number] - [id number] Metallic Hardware/[place] IMP SCR MEDT 5.5/6.0MM SOLERA 5.0X40MM MA [id number]  [employer]  N/A 4 Implanted   IMP SCR MEDT 5.5/6.0MM SOLERA 5.5X45MM MA [id number] - [id number] Metallic Hardware/[place] IMP SCR MEDT 5.5/6.0MM SOLERA 5.5X45MM MA [id number]  [employer]  N/A 1 Implanted   IMP SCR MEDT 5.5/6.0MM SOLERA 5.0X45MM MA [id number] - [id number] Metallic Hardware/[place] IMP SCR MEDT 5.5/6.0MM SOLERA 5.0X45MM MA [id number]  [employer]  N/A 2 Implanted   IMP SCR MEDT 5.5/6.0MM SOLERA 5.0X50MM MA [id number] - [id number] Metallic Hardware/[place] IMP SCR MEDT 5.5/6.0MM SOLERA 5.0X50MM MA [id number]  [employer]  N/A 1 Implanted   IMP SCR SET MEDT SOLERA BREAK OFF 5.5MM TI [id number] - [id number] Metallic Hardware/[place] IMP SCR SET MEDT SOLERA BREAK OFF 5.5MM TI [id number]  [employer]  N/A 8 Implanted   stratosphere size A    [employer]  N/A 1 Implanted   GRAFT  CAN 30CC CRSH 1-10MM [id number] - [id number] Bone/Tissue/Biologic GRAFT [facility] 30CC CRSH 1-10MM [id number] [employer]  N/A 1 Implanted   IMP DEDRA MEDT SOLERA LINED 5.2L629HN CHR  [id number] - [id number] Metallic Hardware/[place] IMP DEDRA MEDT SOLERA LINED 5.5M367LC CHR [id number]  [employer]  N/A 1 Implanted       Drains: HV x1  Intra-op Labs/Cxs/Specimens: None  Complications: No apparent complications during procedure  Findings: Please see dictated operative note for details    Disposition: Stable to PACU    Assessment/Plan:  Emeli Beyer is a 57 year old female s/p Procedure(s):  Extended Pedicle Subtraction Osteotomy Thoracic 7 with Expandable Cage Reconstruction, Posterior Spinal Fusion Thoracic 5 to Thoracic 9, performed secondary to 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 Allan    Ortho  Activity: Up with assist; No excessive bending or twisting; no lifting > 10 pounds x 6 weeks  Weight bearing status: WBAT  Antibiotics: Ancef x 24h  Diet: Begin with clear fluids and progress diet as tolerated; Bowel regimen  DVT prophylaxis: mechanical only, no chemical DVT PPx needed on d/c  Bracing/Splinting: no brace needed  Wound Care: Aquacel dressing to remain in place x 7-10 days  Drains: Document output per shift  Pain management: transition from IV to orals as tolerated.  Olson: until POD 1, then trial void per protocol  X-rays: Upright thoracic XR; thoracic CT prior to discharge - ordered  Physical Therapy: gait training, mobilization ADLs.  Labs: Trend Hgb on POD #1, 2, 3 .   Consults: PT/OT, Hospitalist appreciate assistance in caring for this patient.  Follow-up: Clinic with Dr. Allan @ 6 weeks post op    Disposition: Pending progress with therapies, pain control on orals, and medical stability, anticipate discharge to Home vs Rehab on POD #3-4.    Moe Juan MD PGY4  Orthopaedic Surgery

## 2022-11-07 NOTE — ANESTHESIA PREPROCEDURE EVALUATION
Pre-Operative H & P     CC:  Preoperative exam to assess for increased cardiopulmonary risk while undergoing surgery and anesthesia.    Date of Encounter: 10/14/2022  Primary Care Physician:  System, Provider Not In     Reason for visit:   No diagnosis found.    SY Beyer is a 57 year old female who presents for pre-operative H & P in preparation for  Procedure Information     Date/Time: 11/4/22     Procedure: Transforaminal thoracic diskectomy with interbody fusion thoracic 6-7; use of allograft     Anesthesia type: general    Pre-op diagnosis: thoracic disc disease    Location: [facility]    Providers: Keniamarcioavtar          Patient is being evaluated for comorbid conditions of hypertension, asthma, tobacco use      Ms. Beyer has known T6-7 herniated disc with severe central canal stenosis and myelopathic presentation. She has had 3 previous spine surgeries. She had a fall this past February and has now noticed increased back pain. She has tried medications and PT without significant relief. She was referred to Dr. Allan for further evaluation and is now scheduled for the above procedure.      History is obtained from the patient and chart review    Hx of abnormal bleeding or anti-platelet use: denies    Menstrual history: No LMP recorded (lmp unknown). Patient has had a hysterectomy.     Past Medical History  Past Medical History:   Diagnosis Date     Asthma      HTN (hypertension)        Past Surgical History  Past Surgical History:   Procedure Laterality Date     APPENDECTOMY       HYSTERECTOMY       ORTHOPEDIC SURGERY      multiple previous spine       Prior to Admission Medications  No current outpatient medications on file.       Allergies  Allergies   Allergen Reactions     Penicillins Rash and Shortness Of Breath     Penicillins     Clindamycin Rash     Naproxen Rash     Anaprox TABS       Social History  Social History     Socioeconomic History      Marital status: Single     Spouse name: Not on file     Number of children: Not on file     Years of education: Not on file     Highest education level: Not on file   Occupational History     Not on file   Tobacco Use     Smoking status: Every Day     Packs/day: 1.00     Types: Cigarettes     Smokeless tobacco: Never   Substance and Sexual Activity     Alcohol use: Yes     Alcohol/week: 2.0 standard drinks     Types: 2 Standard drinks or equivalent per week     Drug use: Not Currently     Sexual activity: Not on file   Other Topics Concern     Not on file   Social History Narrative     Not on file     Social Determinants of Health     Financial Resource Strain: Not on file   Food Insecurity: Not on file   Transportation Needs: Not on file   Physical Activity: Not on file   Stress: Not on file   Social Connections: Not on file   Intimate Partner Violence: Not on file   Housing Stability: Not on file       Family History  Family History   Problem Relation Age of Onset     Post Operative Nausea and Vomiting Mother      Deep Vein Thrombosis (DVT) No family hx of        Review of Systems  The complete review of systems is negative other than noted in the HPI or here.   Anesthesia Evaluation   Pt has had prior anesthetic.     History of anesthetic complications  - PONV.      ROS/MED HX  ENT/Pulmonary:     (+) ROBIN risk factors, hypertension, observed stopped breathing, tobacco use, asthma Treatment: Inhaled steroids and Inhaler prn,      Neurologic: Comment: Thoracic disc disease, myelopathic symptoms       Cardiovascular:     (+) hypertension-----Previous cardiac testing   Echo: Date: Results:    Stress Test: Date: Results:    ECG Reviewed: Date: 7/2022 Results:  NSR  Cath: Date: Results:   (-) taking anticoagulants/antiplatelets   METS/Exercise Tolerance: 4 - Raking leaves, gardening Comment: Walking 10-12 blocks wihtout exertional symptoms. Limited by back pain   Hematologic:  - neg hematologic  ROS  (-) history of  blood clots and history of blood transfusion   Musculoskeletal: Comment: Back pain      GI/Hepatic:     (+) GERD (intermittent, using OTC meds PRN),     Renal/Genitourinary:  - neg Renal ROS     Endo:  - neg endo ROS  (-) chronic steroid usage   Psychiatric/Substance Use:  - neg psychiatric ROS     Infectious Disease:  - neg infectious disease ROS     Malignancy:  - neg malignancy ROS     Other:            LMP  (LMP Unknown)     Physical Exam   Constitutional: Awake, alert, cooperative, no apparent distress, and appears stated age.  Eyes: Pupils equal, round and reactive to light, extra ocular muscles intact, sclera clear, conjunctiva normal.  HENT: Normocephalic, oral pharynx with moist mucus membranes, good dentition.   Respiratory: Coarse breath sounds, mild wheezing  Cardiovascular: Regular rate and rhythm, normal S1 and S2, and no murmur noted.  Carotids no bruits. No edema. Palpable pulses to radial  arteries.   GI: Normal bowel sounds, soft, non-distended, non-tender  Genitourinary:  deferred  Skin: Warm and dry.   Musculoskeletal: Full ROM of neck. There is no redness, warmth, or swelling of the exposed joints. Gross motor strength is normal.    Neurologic: Awake, alert, oriented to name, place and time. Cranial nerves II-XII are grossly intact. Gait is normal.   Neuropsychiatric: Calm, cooperative. Normal affect.     Prior Labs/Diagnostic Studies   All labs and imaging personally reviewed     EKG/ stress test - if available please see in ROS above   No results found.  No flowsheet data found.      The patient's records and results personally reviewed by this provider.     Outside records reviewed from: Care Everywhere    LAB/DIAGNOSTIC STUDIES TODAY:  CBC, BMP, T&S    Assessment      Emeli Beyer is a 57 year old female seen as a PAC referral for risk assessment and optimization for anesthesia.    Plan/Recommendations  Pt will be optimized for the proposed procedure.  See below for details on the  "assessment, risk, and preoperative recommendations    NEUROLOGY  - No history of TIA, CVA or seizure  -Post Op delirium risk factors:  No risk identified    ENT  - No current airway concerns.  Will need to be reassessed day of surgery.  Mallampati: Unable to assess  TM: Unable to assess    CARDIAC  - No history of CAD and Afib   -BP elevated in clinic today- patient reports when she was monitoring at home it ran on average 130s/60s. I have asked her to start monitoring at home over the next week and call her PCP if it remains elevated. Patient is agreeable to plan   - METS (Metabolic Equivalents)  Patient performs 4 or more METS exercise without symptoms            Total Score: 0      RCRI-Very low risk: Class 1 0.4% complication rate            Total Score: 0        PULMONARY  ROBIN Medium Risk  *This score is based on incomplete data *           Total Score: 3*    ROBIN: Snores loudly    ROBIN: Hypertension    ROBIN: Over 50 ys old        Criteria with incomplete data:    ROBIN: BMI over 35 kg/m2      - Asthma  using advair daily and albuterol about once/week. She continues to smoke 1PPD. Encouraged decreased smoking, continue to use inhalers. Mild wheezing on exam today- will order duoneb for preop   - Tobacco History      History   Smoking Status     Every Day     Packs/day: 1.00     Types: Cigarettes   Smokeless Tobacco     Never       GI  PONV Medium Risk  Total Score: 2           1 AN PONV: Pt is Female    1 AN PONV: Intended Post Op Opioids        /RENAL  - Baseline Creatinine  WNL    ENDOCRINE    - BMI: Estimated body mass index is 23.94 kg/m  as calculated from the following:    Height as of an earlier encounter on 11/7/22: 1.664 m (5' 5.51\").    Weight as of an earlier encounter on 11/7/22: 66.3 kg (146 lb 2.6 oz).  Healthy Weight (BMI 18.5-24.9)  - No history of Diabetes Mellitus    HEME  VTE Low Risk 0.26%            Total Score: 0      - No history of abnormal bleeding or antiplatelet use.    MSK  -h/o " multiple previous spine surgeries. Now with above procedure planned    ALLERGY  Patient has PCN allergy- reports rash and difficulty breathing. I offered to refer patient to Dr. Bryant for allergy testing, but patient declined      Patient was discussed with Dr. Owusu    The patient is optimized for their procedure. AVS with information on surgery time/arrival time, meds and NPO status given by nursing staff. No further diagnostic testing indicated.      On the day of service:     Prep time: 7 minutes  Visit time: 19 minutes  Documentation time: 9 minutes  ------------------------------------------  Total time: 35 minutes      Eveline Suero PA-C  Preoperative Assessment Center  [facility]  Phone: [phone]  Fax: [phone]    Physical Exam    Airway        Mallampati: II   TM distance: > 3 FB   Neck ROM: limited   Mouth opening: > 3 cm    Respiratory Devices and Support         Dental  no notable dental history         Cardiovascular   cardiovascular exam normal       Rhythm and rate: regular and normal     Pulmonary   pulmonary exam normal        breath sounds clear to auscultation             Anesthesia Plan    ASA Status:  3   NPO Status:  NPO Appropriate    Anesthesia Type: General.     - Airway: ETT   Induction: Intravenous, Propofol.   Maintenance: Balanced.   Techniques and Equipment:     - Lines/Monitors: 2nd IV, Arterial Line     Consents    Anesthesia Plan(s) and associated risks, benefits, and realistic alternatives discussed. Questions answered and patient/representative(s) expressed understanding.    - Discussed:     - Discussed with:  Patient      - Extended Intubation/Ventilatory Support Discussed: No.      - Patient is DNR/DNI Status: No    Use of blood products discussed: Yes.     - Discussed with: Patient.     - Consented: consented to blood products            Reason for refusal: other.     Postoperative Care    Pain management: IV  analgesics, Oral pain medications, Multi-modal analgesia.   PONV prophylaxis: Ondansetron (or other 5HT-3), Background Propofol Infusion     Comments:                       Ann-Marie Pulido MD

## 2022-11-07 NOTE — ANESTHESIA PROCEDURE NOTES
Arterial Line Procedure Note    Pre-Procedure   Staff -        Anesthesiologist:  Tuan Mcleod DO       Performed By: anesthesiologist       Location: OR       Pre-Anesthestic Checklist: patient identified, IV checked, risks and benefits discussed, informed consent, monitors and equipment checked, pre-op evaluation and at physician/surgeon's request  Timeout:       Correct Patient: Yes        Correct Procedure: Yes        Correct Site: Yes        Correct Position: Yes   Line Placement:   This line was placed Post Induction  Procedure   Procedure: arterial line       Laterality: right       Insertion Site: radial.  Sterile Prep        Standard elements of sterile barrier followed       Skin prep: Chloraprep  Insertion/Injection        Technique: Seldinger Technique        Catheter Type/Size: 20 G, 1.75 in/4.5 cm quick cath (integral wire)  Narrative        Tegaderm dressing used.       Complications: None apparent,        Arterial waveform: Yes        IBP within 10% of NIBP: Yes

## 2022-11-07 NOTE — OP NOTE
Date of Service:  11/7/2022      Surgeon:  Evaristo Allan MD   Assistant:  Mckay Juan MD PGY-4    Preoperative Diagnosis:     1.  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  2.  S/p multiple cervical spine surgeries culminating in C3-T1 fusion.  3.  Pseudarthrosis C6-7.  4.  Cervical sagittal malalignment.      Postoperative Diagnosis:     Same      Procedures:   1.  Extended pedicle subtraction (Schwab grade 4) osteotomy T7.  2.  Expandable cage reconstruction T7.  3.  Anterior fusion using local autograft T6-7.  4.  Bilateral inferior facetectomies (Schwab grade 1 osteotomies) T5-6 and T8-9 (2 levels).  5.  Bilateral segmental pedicle screw fixation T5-T9.  6.  Posterior spinal fusion (bilateral interlaminar) T5-T9, using local autograft and crushed cancellous allograft.  7.  Computer navigation using O-arm and Stealth.   8.  Intraoperative spinal cord neuromonitoring using SSEPs and tcMEPs ([employer]).        A-22 modifier is justified for use and a, given the large and heavily calcified thoracic disc herniation, that the extended all the way below the margin of the disc, and was actually behind the T7 vertebral body; also significantly compressing the spinal cord from the ventral aspect.  All these factors necessitated significant extra time and effort greater than 50% usual in taking down the disc herniation and decompressing the spinal cord.  Also necessitated takedown of bilateral pedicles and performing pedicle subtraction osteotomy in order to achieve these goals.  Anesthesia:  General endotracheal.   Local anesthetic:  0.25% marcaine + epinephrine = 50 mL.  EBL:   1200 mL.  Complications:  None apparent.   Implants / Equipment used:   1.  Medtronic Solera screw system: T5 = 5.0 x 35 mm right, 5.0 x 40 mm left; T6 = 5.0 x 40 mm right, 5.5 x 45 mm left; T7 = 5.0 x 45 mm bilateral (both subsequently removed); T8 = 5.0 x 40 mm right, 5.0 x 50 mm left; T9 = 5.0  x 45 mm bilateral.  5.5 mm cobalt chrome rods x2, cut from single long straight rods, manually contoured.  2.  Medtronic Stratosphere expandable cage, size A, 13 mm diameter, no Endcaps.  3.  Crushed cancellous allograft 30 mL.  4.  TXA 30 mg/kg LD then 10 mg/kg/hr.  5.  Vancomycin powder 1 g deep.      Indications:  57 year old female with progressive thoracic myelopathy (gait imbalance).  Imaging revealed large thoracic disc herniation T6-7, with spinal cord compression/deformation.  Tried nonoperative treatment, continues to have significant disabling symptoms.  I thus offered surgery in form of thoracic decompression-diskectomy T6-7 with fusion.  Consented after thorough discussion of the rationale, risks, benefits and alternatives.  Discussed bone graft options; consented to use of local autograft and allograft.      Details:  Properly identified in preop area, site marked, informed consent signed.  Wheeled to OR.  Brief earlier performed.  General anesthesia administered.  Olson inserted.  Antibiotic given: Cefazolin IV.  TXA started.  Positioned prone on Trios table with combo pads.  Thoracolumbar region squared off, prepped with Chlorhexidine scrub, ChloraPrep and draped in sterile fashion. Timeout performed.  Baseline sensory and motor signals show good signals all extremities.  Patient given muscle relaxation for the next half hour.    3 g18 spinal needles inserted left paraspinal at different levels.  Lateral O-arm image showed needle positions at approximate L4, T12 and T7-8,  Papo planned incision centered at T5-T8.  Midline skin incision made; bilateral subperiosteal exposure out to transverse processes.  Bit off dorsal cortex over presumed right T6 pedicle starting point; gearshift pedicle probe inserted into bone.  AP O-arm image showed probe at T6; verified by radiologist; confirmatory timeout performed.  Completed exposure from T5-T8.  Spinous process clamp with joselo frame placed at T7.  3D scan  obtained T5-T8 for navigation purposes    Pedicle screws placed bilateral T5-T8, starting at T8 and proceeding cephalad.  Starting points identified using landmarks and navigated probe.   holes created using oseas.  Track created using joselo awl.  Stealth used to select screw size.  Undertapped by 1 size using joselo tap.  Screw inserted using joselo .  After all screws in place, check scan showed acceptable screw position.  Some screws were a little long.  I backed out the right T7 and left T5 screws.  I have replaced the right T5 screw to a shorter 1 (from 40 mm to 35 mm)..  Spinal cord signals stable.    Bilateral inferior facetectomies (Schwab grade 1 osteotomies) performed at 3 levels T5-T8, using oseas and rongeur.  Bone saved for grafting purposes.  This nicely exposed the articular surface of the superior articular processes bilaterally, both to achieve segmental release and to facilitate transfacet fusion.    At T6-7, I converted the grade 1 osteotomy to grade 2 (Smith-Banuelos osteotomy).  Spinous process and lamina removed using rongeur; bone saved for grafting purposes.  I removed underlying ligamentum flavum and completed laminectomy and removed bilateral superior processes using 3 mm Kerrison.  Completed posterior column release.  Spinal cord signal stable.  We then identified the disc base and proper trajectory using navigated probe.  I used osteotome to develop the plane of the disc bilaterally.  Tried to clean the disc space, and remove disc material using lateral pituitary rongeur.  We palpated the ventral aspect of the canal using [place] elevator.  Noted large immobile heavily calcified disc herniation that was causing severe ventral canal stenosis and putting significant pressure on the spinal cord, left greater than the right.  We tried to push it down using Mele curettes, but it remained immobile.  Spinal cord signals stable.    At this point, I decided that we will not be able to take  down the disc herniation without resecting at least one of the pedicles, in order to give us more room work.  I removed the left C7 pedicle.  Resected the left C7 lamina.  I also resected the left ascending T8 process, as completely unroofing the left T7-8 foramen.  Visualized and protected the exiting left C7 and C8 nerve roots.  I then resected the left T7 pedicle/vertebral body.  Resected the left C7 transverse process and rib head.  I used osteotome to resect the posterior portion of the left T7 vertebral body; bone saved for grafting purposes.  I tried to undermine the calcified disc herniation by using the osteotome to cut immediately into the vertebral body.  However, the calcified disc herniation still would not budge despite pushing it with Mele curettes.  Spinal cord signals stable.    At this point, I decided to perform an extended pedicle subtraction osteotomy of T7.  This would include resection of the right sided pedicle and vertebral body as well.  This would give us the ability to resect vertebral body bone from both sides and hopefully be able to push down the calcified disc fragment ventrally.  However, this would only be best with 2 screws and only 1 level of fixation distal to the osteotomy.  I deemed this would place her at high risk for distal junctional failure.  Thus, I decided to extend our instrumentation and fusion down another level T9.  We extended our exposure distally to T9.  Spinous process clamp with joselo frame applied at T8.  O-arm 3D scan taken to include T9.  Bilateral T9 pedicle screws placed in same fashion as above.  Check scan showed good placement of both screws.     I removed the right T7 screw.  I completed resection of the T7 lamina.  Performed complete facetectomy right T7-8.  Identified and protected right T7 and T8 nerve roots.  I resected right T7 pedicle flush to the vertebral body; resected the transverse process and rib heads.  Resected the right sided T7  posterior vertebral body using osteotomes.  Bone saved for grafting purposes.  I have been angled the osteotome medially in order to respect the medial portion of the posterior T7 vertebral body.  We then used our Mele curettes who tried to push down the calcified disc herniation.  Initially, it still would not move.  I did this several times, incrementally pushing down the distal portion of the bone and calcified disc fragment at a time.  I also used the posterior body wall impactor above and below the calcified disc herniation.  Eventually we were able to completely release the fragment and push it ventrally.  This was removed using pituitary rongeur.  I confirmed complete circumferential decompression of the spinal cord using Rodrigo elevator.  Spinal cord signals stable.  This completed our extended pedicle subtraction osteotomy of T7, including the T6-7 disc.    We then reconstructed the anterior column using expandable titanium cage.  Prior to cage placement, we placed a milana on the right side.  We elected to use a 5.5 mm cobalt chrome rods, cut from long straight milana, and manually contoured using Jerome carreno.  We used a 35 mm diameter size A cage.  This was packed with local autograft and inserted from the left side.  I positioned it anteriorly and medially as possible prior to expanding the cage.  AP and lateral images showed good cage placement.  The ends of the cage were contacting the inferior T6 endplate and the remainder of the T7 vertebral body.  We then placed our left-sided milana.  Final AP and lateral O-arm images showed good placement of the cage and rods, with good coronal and sagittal alignment.  Final tightening of construct performed.  Compression across the osteotomy performed to help with stability of the expandable cage.  Spinal cord signals stable.    Refer posterior fusion performed T5-T9.  Lamina decorticated using oseas.  Osteotomy defect covered with rectangular piece of square  gelfoam.  Bone graft placed over decorticated lamina (morselized local autograft plus crushed cancellous allograft).  Spinal cord signals stable.    Deep medium Hemovac drain placed.  Vanco powder 1 g applied the.  Layered closure: #1 Vicryl pop-offs for deep fascia, running 0 vicryl for deep subq, 2-0 Vicryl for superficial subcutaneous, and subcuticular 3-0 Monocryl.  Skin glue and sterile dressings applied.  Injected anesthetic.  Turned supine, taken off general anesthetic, transferred to PACU in satisfactory condition.       Postop:  Admit to surgical floor.  Antibiotics x 24 hrs.  Mechanical DVT prophylaxis.  Hospitalist medical co-management.  Thoracic CT postop to assess adequacy of decompression.  PT and OT consult.  ADAT.  No lifting more than 10 pounds, no excessive bending or twisting.  Thoracic AP-lat standing x-rays prior to discharge.  Anticipate discharge in 3-4 days.  RTC 6 weeks with EOS full-spine AP-lat x-rays.

## 2022-11-07 NOTE — OR NURSING
Phenylephrine gtt off since 1615, Dr. Pulido aware. MAPs intermittently dropping to low 60's/upper 50's. Patient states feeling slightly light headed but overall not feeling well. Ketamine gtt decreased to 5mg/hr. Dr. Pulido updated on patient status.

## 2022-11-08 ENCOUNTER — APPOINTMENT (OUTPATIENT)
Dept: CT IMAGING | Facility: CLINIC | Age: 57
End: 2022-11-08
Attending: ORTHOPAEDIC SURGERY
Payer: COMMERCIAL

## 2022-11-08 LAB
ABO/RH(D): NORMAL
ANION GAP SERPL CALCULATED.3IONS-SCNC: 9 MMOL/L (ref 3–14)
ANTIBODY SCREEN: NEGATIVE
BLD PROD TYP BPU: NORMAL
BLD PROD TYP BPU: NORMAL
BLOOD COMPONENT TYPE: NORMAL
BLOOD COMPONENT TYPE: NORMAL
BUN SERPL-MCNC: 7 MG/DL (ref 7–30)
CALCIUM SERPL-MCNC: 7.2 MG/DL (ref 8.5–10.1)
CHLORIDE BLD-SCNC: 102 MMOL/L (ref 94–109)
CO2 SERPL-SCNC: 28 MMOL/L (ref 20–32)
CODING SYSTEM: NORMAL
CODING SYSTEM: NORMAL
CREAT SERPL-MCNC: 0.6 MG/DL (ref 0.52–1.04)
CROSSMATCH: NORMAL
CROSSMATCH: NORMAL
ERYTHROCYTE [DISTWIDTH] IN BLOOD BY AUTOMATED COUNT: 12.6 % (ref 10–15)
GFR SERPL CREATININE-BSD FRML MDRD: >90 ML/MIN/1.73M2
GLUCOSE BLD-MCNC: 89 MG/DL (ref 70–99)
GLUCOSE BLDC GLUCOMTR-MCNC: 86 MG/DL (ref 70–99)
HCT VFR BLD AUTO: 19.5 % (ref 35–47)
HGB BLD-MCNC: 6.3 G/DL (ref 11.7–15.7)
HGB BLD-MCNC: 9.4 G/DL (ref 11.7–15.7)
HOLD SPECIMEN: NORMAL
HOLD SPECIMEN: NORMAL
ISSUE DATE AND TIME: NORMAL
ISSUE DATE AND TIME: NORMAL
MCH RBC QN AUTO: 33.5 PG (ref 26.5–33)
MCHC RBC AUTO-ENTMCNC: 32.3 G/DL (ref 31.5–36.5)
MCV RBC AUTO: 104 FL (ref 78–100)
PLATELET # BLD AUTO: 187 10E3/UL (ref 150–450)
POTASSIUM BLD-SCNC: 4 MMOL/L (ref 3.4–5.3)
RBC # BLD AUTO: 1.88 10E6/UL (ref 3.8–5.2)
SODIUM SERPL-SCNC: 139 MMOL/L (ref 133–144)
SPECIMEN EXPIRATION DATE: NORMAL
UNIT ABO/RH: NORMAL
UNIT ABO/RH: NORMAL
UNIT NUMBER: NORMAL
UNIT NUMBER: NORMAL
UNIT STATUS: NORMAL
UNIT STATUS: NORMAL
UNIT TYPE ISBT: 600
UNIT TYPE ISBT: 600
WBC # BLD AUTO: 9.3 10E3/UL (ref 4–11)

## 2022-11-08 PROCEDURE — 36415 COLL VENOUS BLD VENIPUNCTURE: CPT | Performed by: INTERNAL MEDICINE

## 2022-11-08 PROCEDURE — 120N000002 HC R&B MED SURG/OB UMMC

## 2022-11-08 PROCEDURE — 250N000013 HC RX MED GY IP 250 OP 250 PS 637: Performed by: INTERNAL MEDICINE

## 2022-11-08 PROCEDURE — 250N000009 HC RX 250: Performed by: STUDENT IN AN ORGANIZED HEALTH CARE EDUCATION/TRAINING PROGRAM

## 2022-11-08 PROCEDURE — 85027 COMPLETE CBC AUTOMATED: CPT | Performed by: INTERNAL MEDICINE

## 2022-11-08 PROCEDURE — 250N000013 HC RX MED GY IP 250 OP 250 PS 637: Performed by: PHYSICIAN ASSISTANT

## 2022-11-08 PROCEDURE — P9016 RBC LEUKOCYTES REDUCED: HCPCS | Performed by: INTERNAL MEDICINE

## 2022-11-08 PROCEDURE — 36415 COLL VENOUS BLD VENIPUNCTURE: CPT | Performed by: STUDENT IN AN ORGANIZED HEALTH CARE EDUCATION/TRAINING PROGRAM

## 2022-11-08 PROCEDURE — 258N000003 HC RX IP 258 OP 636: Performed by: STUDENT IN AN ORGANIZED HEALTH CARE EDUCATION/TRAINING PROGRAM

## 2022-11-08 PROCEDURE — 86850 RBC ANTIBODY SCREEN: CPT | Performed by: STUDENT IN AN ORGANIZED HEALTH CARE EDUCATION/TRAINING PROGRAM

## 2022-11-08 PROCEDURE — 250N000013 HC RX MED GY IP 250 OP 250 PS 637: Performed by: STUDENT IN AN ORGANIZED HEALTH CARE EDUCATION/TRAINING PROGRAM

## 2022-11-08 PROCEDURE — 86923 COMPATIBILITY TEST ELECTRIC: CPT | Performed by: INTERNAL MEDICINE

## 2022-11-08 PROCEDURE — 250N000011 HC RX IP 250 OP 636: Performed by: INTERNAL MEDICINE

## 2022-11-08 PROCEDURE — 99232 SBSQ HOSP IP/OBS MODERATE 35: CPT | Performed by: INTERNAL MEDICINE

## 2022-11-08 PROCEDURE — 82310 ASSAY OF CALCIUM: CPT | Performed by: INTERNAL MEDICINE

## 2022-11-08 PROCEDURE — 72128 CT CHEST SPINE W/O DYE: CPT

## 2022-11-08 PROCEDURE — 72128 CT CHEST SPINE W/O DYE: CPT | Mod: 26 | Performed by: RADIOLOGY

## 2022-11-08 PROCEDURE — 250N000013 HC RX MED GY IP 250 OP 250 PS 637

## 2022-11-08 PROCEDURE — 250N000011 HC RX IP 250 OP 636: Performed by: STUDENT IN AN ORGANIZED HEALTH CARE EDUCATION/TRAINING PROGRAM

## 2022-11-08 PROCEDURE — 250N000013 HC RX MED GY IP 250 OP 250 PS 637: Performed by: CLINICAL NURSE SPECIALIST

## 2022-11-08 PROCEDURE — 250N000011 HC RX IP 250 OP 636

## 2022-11-08 PROCEDURE — 85018 HEMOGLOBIN: CPT | Performed by: INTERNAL MEDICINE

## 2022-11-08 PROCEDURE — 86901 BLOOD TYPING SEROLOGIC RH(D): CPT | Performed by: STUDENT IN AN ORGANIZED HEALTH CARE EDUCATION/TRAINING PROGRAM

## 2022-11-08 RX ORDER — POLYETHYLENE GLYCOL 3350 17 G/17G
17 POWDER, FOR SOLUTION ORAL DAILY PRN
Status: DISCONTINUED | OUTPATIENT
Start: 2022-11-08 | End: 2022-11-09

## 2022-11-08 RX ORDER — OXYCODONE HYDROCHLORIDE 5 MG/1
5-10 TABLET ORAL
Status: DISCONTINUED | OUTPATIENT
Start: 2022-11-08 | End: 2022-11-09

## 2022-11-08 RX ORDER — AMOXICILLIN 250 MG
1 CAPSULE ORAL
Status: DISCONTINUED | OUTPATIENT
Start: 2022-11-08 | End: 2022-11-09

## 2022-11-08 RX ADMIN — CEFAZOLIN 1 G: 1 INJECTION, POWDER, FOR SOLUTION INTRAMUSCULAR; INTRAVENOUS at 04:40

## 2022-11-08 RX ADMIN — CALCIUM CARBONATE (ANTACID) CHEW TAB 500 MG 1000 MG: 500 CHEW TAB at 13:49

## 2022-11-08 RX ADMIN — ACETAMINOPHEN 325 MG: 325 TABLET, FILM COATED ORAL at 06:38

## 2022-11-08 RX ADMIN — FAMOTIDINE 20 MG: 10 INJECTION INTRAVENOUS at 19:16

## 2022-11-08 RX ADMIN — OXYCODONE HYDROCHLORIDE 10 MG: 5 TABLET ORAL at 16:49

## 2022-11-08 RX ADMIN — Medication 0.3 MG/HR: at 00:22

## 2022-11-08 RX ADMIN — NICOTINE 1 PATCH: 21 PATCH, EXTENDED RELEASE TRANSDERMAL at 21:14

## 2022-11-08 RX ADMIN — OXYCODONE HYDROCHLORIDE 10 MG: 5 TABLET ORAL at 13:49

## 2022-11-08 RX ADMIN — FLUTICASONE FUROATE AND VILANTEROL TRIFENATATE 1 PUFF: 100; 25 POWDER RESPIRATORY (INHALATION) at 08:56

## 2022-11-08 RX ADMIN — METHOCARBAMOL 750 MG: 750 TABLET ORAL at 21:28

## 2022-11-08 RX ADMIN — SENNOSIDES AND DOCUSATE SODIUM 1 TABLET: 50; 8.6 TABLET ORAL at 21:14

## 2022-11-08 RX ADMIN — HYDROXYZINE HYDROCHLORIDE 50 MG: 25 TABLET, FILM COATED ORAL at 18:45

## 2022-11-08 RX ADMIN — METHOCARBAMOL 750 MG: 750 TABLET ORAL at 04:27

## 2022-11-08 RX ADMIN — OXYCODONE HYDROCHLORIDE 10 MG: 5 TABLET ORAL at 20:07

## 2022-11-08 RX ADMIN — SODIUM CHLORIDE: 9 INJECTION, SOLUTION INTRAVENOUS at 06:36

## 2022-11-08 RX ADMIN — FLUOXETINE HYDROCHLORIDE 10 MG: 10 CAPSULE ORAL at 21:14

## 2022-11-08 RX ADMIN — ACETAMINOPHEN 325 MG: 325 TABLET, FILM COATED ORAL at 19:15

## 2022-11-08 RX ADMIN — ONDANSETRON 4 MG: 2 INJECTION INTRAMUSCULAR; INTRAVENOUS at 08:54

## 2022-11-08 RX ADMIN — FAMOTIDINE 20 MG: 10 INJECTION INTRAVENOUS at 09:47

## 2022-11-08 RX ADMIN — CALCIUM CARBONATE (ANTACID) CHEW TAB 500 MG 1000 MG: 500 CHEW TAB at 06:30

## 2022-11-08 RX ADMIN — PROCHLORPERAZINE MALEATE 5 MG: 5 TABLET ORAL at 10:14

## 2022-11-08 RX ADMIN — KETAMINE HYDROCHLORIDE 5 MG/HR: 100 INJECTION, SOLUTION, CONCENTRATE INTRAMUSCULAR; INTRAVENOUS at 12:17

## 2022-11-08 RX ADMIN — OXYCODONE HYDROCHLORIDE 10 MG: 5 TABLET ORAL at 08:54

## 2022-11-08 RX ADMIN — METHOCARBAMOL 750 MG: 750 TABLET ORAL at 14:32

## 2022-11-08 RX ADMIN — HYDROXYZINE HYDROCHLORIDE 50 MG: 25 TABLET, FILM COATED ORAL at 11:06

## 2022-11-08 RX ADMIN — ACETAMINOPHEN 325 MG: 325 TABLET, FILM COATED ORAL at 11:05

## 2022-11-08 RX ADMIN — ONDANSETRON 4 MG: 2 INJECTION INTRAMUSCULAR; INTRAVENOUS at 18:45

## 2022-11-08 ASSESSMENT — ACTIVITIES OF DAILY LIVING (ADL)
ADLS_ACUITY_SCORE: 19

## 2022-11-08 NOTE — PROGRESS NOTES
"[facility], [place]   Internal Medicine Daily Note           Interval History/Events     Overnight events reviewed  Reports some nausea, and heart burn issue intermittently  No chest pain, shortness of breath  No fever, chills       Review of Systems        4 point ROS including Respiratory, CV, GI and , other than that noted above is negative      Medications   I have reviewed current medications  in the \"current medication\" section of Epic.  Relevant changes include:     Physical Exam   General:       Vital signs:    Blood pressure 101/74, pulse 89, temperature 98.9  F (37.2  C), temperature source Oral, resp. rate 18, height 1.664 m (5' 5.51\"), weight 66.3 kg (146 lb 2.6 oz), SpO2 98 %, not currently breastfeeding.  Estimated body mass index is 23.94 kg/m  as calculated from the following:    Height as of this encounter: 1.664 m (5' 5.51\").    Weight as of this encounter: 66.3 kg (146 lb 2.6 oz).      Intake/Output Summary (Last 24 hours) at 11/8/2022 1417  Last data filed at 11/8/2022 0639  Gross per 24 hour   Intake 740 ml   Output 1350 ml   Net -610 ml        Constitutional: Laying in bed in no acute distress  Eye: No icterus, no pallor  Mouth/ENT: Normal oral mucosa  Cardiovascular: S1, S2 normal  Respiratory: B/L CTA  GI: Soft, NT, BS+  :   Neurology:   Psych:   MSK:   Integumentary:   Heme/Lymph/Imm:      Laboratory and Imaging Studies     I have reviewed  laboratory and imaging studies in the Epic. Pertinent findings are as below:    BMP  Recent Labs   Lab 11/08/22  0632 11/08/22  0546 11/07/22  1151 11/07/22  0630   NA  --  139 136  --    POTASSIUM  --  4.0 4.3  --    CHLORIDE  --  102  --   --    KASIA  --  7.2*  --   --    CO2  --  28  --   --    BUN  --  7  --   --    CR  --  0.60  --   --    GLC 86 89 136* 98     CBC  Recent Labs   Lab 11/08/22  0546 11/07/22  1429   WBC 9.3 12.0*   RBC 1.88* 2.58*   HGB 6.3* 8.6*   HCT 19.5* 25.9*   * 100   MCH 33.5* 33.3*   MCHC " 32.3 33.2   RDW 12.6 12.5    280     INRNo lab results found in last 7 days.  LFTsNo lab results found in last 7 days.   PANCNo lab results found in last 7 days.        Impression/Plan         Evaluation, Recommendations and Co-management of Medical Comorbidities.         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  By Dr Allan.   EBL: 900 CC  Complications: None.            PMH, Pre Op eval reviewed.  Comorbid conditions of hypertension, asthma, tobacco use.           # s/p spinal surgery on 11/08/2022:  Management primarily per Ortho team.  - Wound cares, Dressings, Surgical pain management, DVT Prophylaxis  per primary team.   - Monitor anemia, hemodynamics, Input/Output, Capnography (for 24 hours)  - Encourage Incentive spirometry  - Laxatives for constipation prophylaxis     # Anemia: Most likely due to  blood loss, and post surgical inflammation.   Pre op Hgb 12.5 .   Hg 6.3 gm/dl on 11/08  Discussed with Ortho  - Transfuse 2 units PRBC  - Follow up hg post transfusion     CVS:   No hx of CAD. Afib.   HTN: hold pta med 2.2 soft bp. Monitor. Resume as able.      Pulm:   Tobacco use:   Asthma: stable. Ct home inhalers.   -using advair daily and albuterol about once/week.   -She continues to smoke 1PPD. Encouraged decreased smoking, nicotine patch     GERD:   PONV:   currently with heartburn  - Continue  famotidine IV 20 mg bid   - tums prn  - antiemetics prn     Psychiatry: mood stable.   - Continue pta fluoxetine.         Pt's care was discussed with bedside RN, patient and  during Care Team Rounds.               Juan Manuel Ruano MD  Hospitalist ( Internal medicine)  Pager: [phone]

## 2022-11-08 NOTE — PLAN OF CARE
End of shift Summary: See flowsheet for VS and detail assessments.    Changes this Shift:     Vitals: Pts BP has been soft 90's/60-70's.     Pulmonary: Pt lungs clear and equal bilaterally. Pt denies SOB, CP and headache. Pt on 2/3L NC (destats when sleeping but not diagnosed w/ sleep apnea)    GI/: Olson draining clear yellow urine. Pt complains of significant nausea PRN Zofran and Tums given.     Activity: Pt not OOB during shift but log rolled well.     Skin: Small scab on mid lower back. 21mg nicotine patch on right shoulder    Pain: 5-8/10. PRN robaxin, oxy, atarax and IV dilaudid given.     Neuro/CMS: A&Ox4. Denies N/T. Pt typically has numbness in hands.     Dressing: Midline thoracic dressing CDI.     IV/Drains: Accordion drain with 55mL output during shift. PIV in L wrist and forearm. 500mL bolus of LR given at 2144. IV ketamine drip running at 5mg/hr. IV dilaudid drip ordered but not on floor as of 22:46.

## 2022-11-08 NOTE — PHARMACY-ADMISSION MEDICATION HISTORY
Admission Medication History Completed by Pharmacy    See [facility] Admission Navigator for allergy information, preferred outpatient pharmacy, prior to admission medications and immunization status.     Medication History Sources:     Patient    Surescripts Dispense Records    Changes made to PTA medication list (reason):    Added: nicotine patch, tylenol PRN (per patient)    Deleted: biotin     Changed:   o Docusate 100 mg QAM --> docusate 100-200 mg QAM    Additional Information:    None    Prior to Admission medications    Medication Sig Last Dose Taking? Auth Provider Long Term End Date   acetaminophen (TYLENOL) 500 MG tablet Take 500 mg by mouth every 6 hours as needed for mild pain Past Week at PRN Yes Unknown, Entered By History     ADVAIR -21 MCG/ACT inhaler INHALE 2 PUFFS BY MOUTH TWICE DAILY 11/7/2022 at 430 Yes Reported, Patient Yes    docusate sodium (COLACE) 100 MG capsule Take 100-200 mg by mouth every morning 11/7/2022 at 430 Yes Reported, Patient     FLUoxetine (PROZAC) 10 MG capsule Take 10 mg by mouth At Bedtime 11/6/2022 at 2130 Yes Reported, Patient Yes    lisinopril-hydrochlorothiazide (ZESTORETIC) 20-12.5 MG tablet Take 1 tablet by mouth every morning 11/6/2022 at 730 Yes Reported, Patient Yes    nicotine (NICODERM CQ) 21 MG/24HR 24 hr patch Place 1 patch onto the skin every 24 hours 11/7/2022 at AM (had removed prior to surgery) Yes Unknown, Entered By History         Date completed: 11/07/22    Medication history completed by: Penny Grant [facility]

## 2022-11-08 NOTE — CONSULTS
HOSPITALIST CONSULTATION     REQUESTING PHYSICIAN: Evaristo Allan MD    REASON FOR CONSULTATION: Evaluation, Recommendations and Co-management of Medical Comorbidities.     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  By Dr Allan.   EBL: 900 CC  Complications: None.          PMH, Pre Op eval reviewed.  Comorbid conditions of hypertension, asthma, tobacco use.         # Orthopedic Surgery:   POD 0    Post Op Management per Primary team.   Hemodynamics: Post op hypotension, sp fluid bolus. bp better, still soft. Try  ml bolus. Continue on gentle IV fluids, until adequate PO. Lactic acid: 1.4, reassuring.      Post op 24 hr capnography per protocol.   Pain control- per Primary team and/or Pain team.    Judicious use of narcotics as able.   Consider bowel regimen with narcotics.   Encourage Incentive spirometry to prevent atelectasis.  DVT prophylaxis- per Primary team.mechanical only, no chemical DVT PPx needed on d/c  Activity, antibiotics, wound and/or drain care - per Primary team. Ancef x 24h    Anemia of acute blood loss: Pre op Hgb 12.5 .   Monitor hgb for anemia of acute blood loss. Transfuse for hgb <7.0  Last Hgb 8.6    CVS:   No hx of CAD. Afib.   HTN: hold pta med 2.2 soft bp. Monitor. Resume as able.     Pulm:   Tobacco use:   Asthma: stable. Ct home inhalers.   -using advair daily and albuterol about once/week.   -She continues to smoke 1PPD. Encouraged decreased smoking, nicotine patch    GERD:   PONV:   currently with heartburn  - iv famotidine 20 mg bid x 2 doses  - tums prn  - antiemetics prn    Psychiatry: mood stable.   - Continue pta  fluoxetine.     Rest per primary.     Thank you for the consultation. Please page with any question. Hospitalist team to follow.      Jorge Henson MD   Hospitalist, Internal Medicine      CHIEF COMPLAINT: Post Op: incisional pain, fatigue.     HISTORY OF PRESENT ILLNESS: Obtained from the patient and chart review including Pre op evaluation, procedure note.    57 year old year old female  with above discussed medical problems s/p above procedure admitted on 11/7/2022  for post op care and monitoring  (for further details for indication of surgery and operative note, please refer to Evaristo Allan MD note).   No documented hypotension, hypoxemia or other significant complications intra or post operative.   Medicine consulted to evaluate, recommend and/or co manage medical co morbidities.  Currently: Hemodynamics stable. Incisional Pain not controlled. Denies any chest pain, cough, shortness of breath or LH or palpitations. Heartburn+. No vomiting or pain abdomen. No fever or chills. Denies any new rash. No new focal neuro deficit. Fatigue+. No other concern.   Medical issues as discussed above.     PAST MEDICAL HISTORY:   Past Medical History:   Diagnosis Date     Asthma      HTN (hypertension)        PAST SURGICAL HISTORY:   Past Surgical History:   Procedure Laterality Date     APPENDECTOMY       HYSTERECTOMY       ORTHOPEDIC SURGERY      multiple previous spine       FH: reviewed.     Family History   Problem Relation Age of Onset     Post Operative Nausea and Vomiting Mother      Deep Vein Thrombosis (DVT) No family hx of         SH: reviewed.     Social History     Socioeconomic History     Marital status: Single     Spouse name: None     Number of children: None     Years of education: None     Highest education level: None   Tobacco Use     Smoking status: Every Day     Packs/day: 1.00     Types: Cigarettes     Smokeless tobacco: Never   Substance and Sexual Activity     Alcohol use: Yes      Alcohol/week: 2.0 standard drinks     Types: 2 Standard drinks or equivalent per week     Drug use: Not Currently       ALLERGIES:   Allergies   Allergen Reactions     Penicillins Rash and Shortness Of Breath     Penicillins     Clindamycin Rash     Naproxen Rash     Anaprox TABS         HOME MEDICATIONS:     Prior to Admission medications    Medication Sig Start Date End Date Taking? Authorizing Provider   acetaminophen (TYLENOL) 500 MG tablet Take 500 mg by mouth every 6 hours as needed for mild pain   Yes Unknown, Entered By History   ADVAIR -21 MCG/ACT inhaler INHALE 2 PUFFS BY MOUTH TWICE DAILY 8/23/22  Yes Reported, Patient   docusate sodium (COLACE) 100 MG capsule Take 100-200 mg by mouth every morning   Yes Reported, Patient   FLUoxetine (PROZAC) 10 MG capsule Take 10 mg by mouth At Bedtime 8/17/22  Yes Reported, Patient   lisinopril-hydrochlorothiazide (ZESTORETIC) 20-12.5 MG tablet Take 1 tablet by mouth every morning 10/15/21  Yes Reported, Patient   nicotine (NICODERM CQ) 21 MG/24HR 24 hr patch Place 1 patch onto the skin every 24 hours   Yes Unknown, Entered By History       CURRENT MEDICATIONS:    Current Facility-Administered Medications   Medication     acetaminophen (TYLENOL) tablet 325 mg     albuterol (PROVENTIL) neb solution 2.5 mg     ceFAZolin (ANCEF) 1 g vial to attach to  ml bag for ADULT or 50 ml bag for PEDS     fluticasone-vilanterol (BREO ELLIPTA) 100-25 MCG/ACT inhaler 1 puff     HYDROmorphone (DILAUDID) injection 0.2-0.4 mg     hydrOXYzine (ATARAX) tablet 25 mg    Or     hydrOXYzine (ATARAX) tablet 50 mg     ketamine (KETALAR) 2 mg/mL in sodium chloride 0.9 % 50 mL ANALGESIA infusion     ketamine bolus from infusion pump 11.64 mg     lactated ringers BOLUS 500 mL     lidocaine (LMX4) cream     lidocaine 1 % 0.1-1 mL     methocarbamol (ROBAXIN) tablet 750 mg     naloxone (NARCAN) injection 0.2 mg    Or     naloxone (NARCAN) injection 0.4 mg    Or     naloxone (NARCAN)  "injection 0.2 mg    Or     naloxone (NARCAN) injection 0.4 mg     nicotine (NICODERM CQ) 21 MG/24HR 24 hr patch 1 patch     nicotine Patch in Place     ondansetron (ZOFRAN) injection 4 mg     oxyCODONE (ROXICODONE) tablet 5-10 mg     sodium chloride (PF) 0.9% PF flush 3 mL     sodium chloride (PF) 0.9% PF flush 3 mL     sodium chloride 0.9% infusion         ROS: 10 point ROS neg other than the symptoms noted above in the HPI.    PHYSICAL EXAMINATION:     /69   Pulse 68   Temp 97  F (36.1  C) (Oral)   Resp 19   Ht 1.664 m (5' 5.51\")   Wt 66.3 kg (146 lb 2.6 oz)   SpO2 99%   BMI 23.94 kg/m    Temp (24hrs), Av.8  F (36.6  C), Min:97  F (36.1  C), Max:98.2  F (36.8  C)      BMI= Body mass index is 23.94 kg/m .      Intake/Output Summary (Last 24 hours) at 20222  Last data filed at 2022 1800  Gross per 24 hour   Intake 4738.33 ml   Output 1985 ml   Net 2753.33 ml       General: Awake, interactive, NAD.   HEENT: AT/NC. No icterus. Moist MM.    Neck: Supple.    Heart/CVS: Normal S1 and S2. Regular.   Chest/Respi: Normal work of breathing. CTA BL.   Abdomen/GI: Soft, non distended, non tender. No g/r.  Extremities/MSK: Distal pulses 2+, well perfused. Rest per ortho.   Neuro: Alert and oriented x4. Grossly non focal.   Skin:  No new rash over exposed areas.       LABORATORY DATA: reviewed.     Recent Results (from the past 24 hour(s))   Glucose by meter    Collection Time: 22  6:30 AM   Result Value Ref Range    GLUCOSE BY METER POCT 98 70 - 99 mg/dL   Arterial Panel (aka ABAT)    Collection Time: 22 11:51 AM   Result Value Ref Range    pH Arterial 7.40 7.35 - 7.45    pCO2 Arterial 42 35 - 45 mm Hg    pO2 Arterial 175 (H) 80 - 105 mm Hg    Bicarbonate Arterial 26 21 - 28 mmol/L    Base Excess/Deficit (+/-) 1.3 -9.0 - 1.8 mmol/L    FIO2 40     Sodium 136 133 - 144 mmol/L    Potassium 4.3 3.4 - 5.3 mmol/L    Glucose 136 (H) 70 - 99 mg/dL    Calcium Ionized 4.1 (L) 4.4 - 5.2 mg/dL    " Hemoglobin 9.5 (L) 11.7 - 15.7 g/dL   Lactic acid whole blood    Collection Time: 11/07/22 11:51 AM   Result Value Ref Range    Lactic Acid 1.4 0.7 - 2.0 mmol/L   CBC with platelets    Collection Time: 11/07/22  2:29 PM   Result Value Ref Range    WBC Count 12.0 (H) 4.0 - 11.0 10e3/uL    RBC Count 2.58 (L) 3.80 - 5.20 10e6/uL    Hemoglobin 8.6 (L) 11.7 - 15.7 g/dL    Hematocrit 25.9 (L) 35.0 - 47.0 %     78 - 100 fL    MCH 33.3 (H) 26.5 - 33.0 pg    MCHC 33.2 31.5 - 36.5 g/dL    RDW 12.5 10.0 - 15.0 %    Platelet Count 280 150 - 450 10e3/uL       Recent Results (from the past 24 hour(s))   XR Surgery JOSHUA L/T 5 Min Fluoro w Stills    Narrative    Exam: XR SURGERY JOSHUA FLUORO LESS THAN 5 MIN W STILLS, 11/7/2022 9:01  AM    Indication: Intraoperative image for instrument localization.    Comparison: There is no one-to-one comparison which evaluates the  lumbosacral anatomy. 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 Given this finding T11 appears to be the  lowest full weightbearing rib vertebral level on the current over  lateral image.    Findings: Lateral crosstable thoracolumbar radiograph obtained at  08:25:26 labeled series #4 was used for spinal enumeration.  Intraoperative film for instrument localization. A radiopaque  instrument is pointed at the level of the superior endplate of T6.    Multilevel degenerative changes of thoracolumbar spine.      Impression    Impression:   Radiopaque instrument pointed at the level of the superior endplate of  T6.    These findings were told to the OR staff at the time of scan.    I have personally reviewed the examination and initial interpretation  and I agree with the findings.    JACQUES LAM MD         SYSTEM ID:  [id number]           Jorge Henson MD        This note was in part completed with Dragon, a speech recognition software. Some grammatical and transcription errors may have occurred. If you have any concern, please  contact me for clarification.

## 2022-11-08 NOTE — PROGRESS NOTES
Orthopaedic Surgery Progress Note 11/08/2022    S: No acute events overnight.  Pain well controlled. Denies numbness or tingling. Denies chest pain, SOB, nausea/vomiting. Tolerating regular diet.  Voiding via manzanares.  Has not been OOB.    O:  Temp: 98  F (36.7  C) Temp src: Oral BP: 106/61 Pulse: 77   Resp: 14 SpO2: 98 % O2 Device: Nasal cannula Oxygen Delivery: 2.5 LPM    Exam:  Gen: No acute distress, resting comfortably in bed.  Resp: Non-labored breathing  MSK: Dressing CDI    Motor Strength Right Left   Hip flexion: L1, L2, L3 5/5 5/5   Hip adduction: L2, L3 5/5 5/5   Knee flexion: S1 5/5 5/5   Knee extension: L3, L4 5/5 5/5   Ankle dosiflexion: L4, L5 5/5 5/5   EHL: L5 5/5 5/5   Ankle plantarflexion: S1 5/5 5/5     Sensation from L1-S2 is preserved.    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 Allan     11/08/22  PT/OT  Pain control  Continue drain    Ortho  Activity: Up with assist; No excessive bending or twisting; no lifting > 10 pounds x 6 weeks  Weight bearing status: WBAT  Antibiotics: Ancef x 24h  Diet: Begin with clear fluids and progress diet as tolerated; Bowel regimen  DVT prophylaxis: mechanical only, no chemical DVT PPx needed on d/c  Bracing/Splinting: no brace needed  Wound Care: Aquacel dressing to remain in place x 7-10 days  Drains: Document output per shift  Pain management: transition from IV to orals as tolerated.  Manzanares: until POD 1, then trial void per protocol  X-rays: Upright thoracic XR; thoracic CT prior to discharge - ordered  Physical Therapy: gait training, mobilization  ADLs.  Labs: Trend Hgb on POD #1, 2, 3 .   Consults: PT/OT, Hospitalist appreciate assistance in caring for this patient.  Follow-up: Clinic with Dr. Allan @ 6 weeks post op     Disposition: Pending progress with therapies, pain control on orals, and medical stability, anticipate discharge to Home vs Rehab on POD #3-4.     Moe Juan MD PGY4  Orthopaedic Surgery

## 2022-11-08 NOTE — PROGRESS NOTES
A&Ox4, calm and cooperative.  VSS except for BP (runs soft in 80's-90's systolic).  Denied n/t, n/v, SOB, chest pain.  On 2.5LO2 via NC with capno, manzanares in place with good output.  Accordion drain moderate output.  Dressing slightly saturated.  Pt pain managed with tylenol, robaxin, ice packs.  2 LPIV's, one running ketamine 5mg/hr, one running dilaudid PCA 0.3mg/hr.  C/o heartburn, given tums, nicotine patch on R shoulder.  Critical hemoglobin 6.3 at 0619, provider notified.  Continue with POC.

## 2022-11-08 NOTE — PLAN OF CARE
Pt had a CT ordered today. Pt received oxycodone for pain, pt complained of breakthrough pain; received PRN Atarax. Pt states pain medication is not helping very much. Pt was visited by a few family members this shift. Pt has not been OOB yet, waiting for pt to be seen by PT. Waiting to see how pt walks before removing catheter. Continue POC.

## 2022-11-09 ENCOUNTER — APPOINTMENT (OUTPATIENT)
Dept: OCCUPATIONAL THERAPY | Facility: CLINIC | Age: 57
End: 2022-11-09
Attending: ORTHOPAEDIC SURGERY
Payer: COMMERCIAL

## 2022-11-09 ENCOUNTER — APPOINTMENT (OUTPATIENT)
Dept: GENERAL RADIOLOGY | Facility: CLINIC | Age: 57
End: 2022-11-09
Attending: INTERNAL MEDICINE
Payer: COMMERCIAL

## 2022-11-09 ENCOUNTER — APPOINTMENT (OUTPATIENT)
Dept: PHYSICAL THERAPY | Facility: CLINIC | Age: 57
End: 2022-11-09
Attending: ORTHOPAEDIC SURGERY
Payer: COMMERCIAL

## 2022-11-09 ENCOUNTER — APPOINTMENT (OUTPATIENT)
Dept: GENERAL RADIOLOGY | Facility: CLINIC | Age: 57
End: 2022-11-09
Attending: ORTHOPAEDIC SURGERY
Payer: COMMERCIAL

## 2022-11-09 LAB — GLUCOSE BLDC GLUCOMTR-MCNC: 99 MG/DL (ref 70–99)

## 2022-11-09 PROCEDURE — 250N000013 HC RX MED GY IP 250 OP 250 PS 637

## 2022-11-09 PROCEDURE — 250N000013 HC RX MED GY IP 250 OP 250 PS 637: Performed by: CLINICAL NURSE SPECIALIST

## 2022-11-09 PROCEDURE — 250N000011 HC RX IP 250 OP 636: Performed by: INTERNAL MEDICINE

## 2022-11-09 PROCEDURE — 97165 OT EVAL LOW COMPLEX 30 MIN: CPT | Mod: GO

## 2022-11-09 PROCEDURE — 74018 RADEX ABDOMEN 1 VIEW: CPT | Mod: 26 | Performed by: RADIOLOGY

## 2022-11-09 PROCEDURE — 97161 PT EVAL LOW COMPLEX 20 MIN: CPT | Mod: GP | Performed by: PHYSICAL THERAPIST

## 2022-11-09 PROCEDURE — 250N000013 HC RX MED GY IP 250 OP 250 PS 637: Performed by: INTERNAL MEDICINE

## 2022-11-09 PROCEDURE — 999N000065 XR THORACIC LUMBAR STANDING 2 VIEWS

## 2022-11-09 PROCEDURE — 97116 GAIT TRAINING THERAPY: CPT | Mod: GP | Performed by: PHYSICAL THERAPIST

## 2022-11-09 PROCEDURE — 72080 X-RAY EXAM THORACOLMB 2/> VW: CPT

## 2022-11-09 PROCEDURE — 99232 SBSQ HOSP IP/OBS MODERATE 35: CPT | Performed by: INTERNAL MEDICINE

## 2022-11-09 PROCEDURE — 97535 SELF CARE MNGMENT TRAINING: CPT | Mod: GO

## 2022-11-09 PROCEDURE — 250N000013 HC RX MED GY IP 250 OP 250 PS 637: Performed by: PHYSICIAN ASSISTANT

## 2022-11-09 PROCEDURE — 258N000003 HC RX IP 258 OP 636: Performed by: STUDENT IN AN ORGANIZED HEALTH CARE EDUCATION/TRAINING PROGRAM

## 2022-11-09 PROCEDURE — 99207 PR APP CREDIT; MD BILLING SHARED VISIT: CPT | Performed by: PHYSICIAN ASSISTANT

## 2022-11-09 PROCEDURE — 74018 RADEX ABDOMEN 1 VIEW: CPT

## 2022-11-09 PROCEDURE — 250N000013 HC RX MED GY IP 250 OP 250 PS 637: Performed by: STUDENT IN AN ORGANIZED HEALTH CARE EDUCATION/TRAINING PROGRAM

## 2022-11-09 PROCEDURE — 120N000002 HC R&B MED SURG/OB UMMC

## 2022-11-09 RX ORDER — POLYETHYLENE GLYCOL 3350 17 G/17G
17 POWDER, FOR SOLUTION ORAL DAILY
Status: DISCONTINUED | OUTPATIENT
Start: 2022-11-09 | End: 2022-11-10

## 2022-11-09 RX ORDER — AMOXICILLIN 250 MG
1 CAPSULE ORAL 2 TIMES DAILY
Status: DISCONTINUED | OUTPATIENT
Start: 2022-11-09 | End: 2022-11-11 | Stop reason: HOSPADM

## 2022-11-09 RX ORDER — BISACODYL 10 MG
10 SUPPOSITORY, RECTAL RECTAL DAILY PRN
Status: DISCONTINUED | OUTPATIENT
Start: 2022-11-09 | End: 2022-11-11 | Stop reason: HOSPADM

## 2022-11-09 RX ORDER — DOCUSATE SODIUM 100 MG/1
100-200 CAPSULE, LIQUID FILLED ORAL EVERY MORNING
Status: DISCONTINUED | OUTPATIENT
Start: 2022-11-10 | End: 2022-11-09

## 2022-11-09 RX ORDER — AMOXICILLIN 250 MG
1 CAPSULE ORAL AT BEDTIME
Status: DISCONTINUED | OUTPATIENT
Start: 2022-11-09 | End: 2022-11-09

## 2022-11-09 RX ORDER — HYDROMORPHONE HYDROCHLORIDE 2 MG/1
2-4 TABLET ORAL
Status: DISCONTINUED | OUTPATIENT
Start: 2022-11-09 | End: 2022-11-11 | Stop reason: HOSPADM

## 2022-11-09 RX ORDER — BISACODYL 10 MG
10 SUPPOSITORY, RECTAL RECTAL DAILY PRN
Status: DISCONTINUED | OUTPATIENT
Start: 2022-11-09 | End: 2022-11-09

## 2022-11-09 RX ORDER — DOCUSATE SODIUM 100 MG/1
100-200 CAPSULE, LIQUID FILLED ORAL 2 TIMES DAILY
Status: DISCONTINUED | OUTPATIENT
Start: 2022-11-09 | End: 2022-11-10

## 2022-11-09 RX ADMIN — HYDROMORPHONE HYDROCHLORIDE 4 MG: 2 TABLET ORAL at 16:02

## 2022-11-09 RX ADMIN — METHOCARBAMOL 750 MG: 750 TABLET ORAL at 14:18

## 2022-11-09 RX ADMIN — ACETAMINOPHEN 325 MG: 325 TABLET, FILM COATED ORAL at 00:50

## 2022-11-09 RX ADMIN — FLUOXETINE HYDROCHLORIDE 10 MG: 10 CAPSULE ORAL at 21:02

## 2022-11-09 RX ADMIN — OXYCODONE HYDROCHLORIDE 10 MG: 5 TABLET ORAL at 05:15

## 2022-11-09 RX ADMIN — OXYCODONE HYDROCHLORIDE 10 MG: 5 TABLET ORAL at 00:23

## 2022-11-09 RX ADMIN — FAMOTIDINE 20 MG: 10 INJECTION INTRAVENOUS at 08:28

## 2022-11-09 RX ADMIN — SODIUM CHLORIDE: 9 INJECTION, SOLUTION INTRAVENOUS at 21:57

## 2022-11-09 RX ADMIN — SENNOSIDES AND DOCUSATE SODIUM 1 TABLET: 50; 8.6 TABLET ORAL at 21:02

## 2022-11-09 RX ADMIN — DOCUSATE SODIUM 100 MG: 100 CAPSULE, LIQUID FILLED ORAL at 21:57

## 2022-11-09 RX ADMIN — HYDROMORPHONE HYDROCHLORIDE 4 MG: 2 TABLET ORAL at 19:26

## 2022-11-09 RX ADMIN — PROCHLORPERAZINE MALEATE 5 MG: 5 TABLET ORAL at 10:42

## 2022-11-09 RX ADMIN — METHOCARBAMOL 750 MG: 750 TABLET ORAL at 03:02

## 2022-11-09 RX ADMIN — HYDROMORPHONE HYDROCHLORIDE 4 MG: 2 TABLET ORAL at 08:28

## 2022-11-09 RX ADMIN — HYDROXYZINE HYDROCHLORIDE 50 MG: 25 TABLET, FILM COATED ORAL at 05:15

## 2022-11-09 RX ADMIN — NICOTINE 1 PATCH: 21 PATCH, EXTENDED RELEASE TRANSDERMAL at 21:03

## 2022-11-09 RX ADMIN — FLUTICASONE FUROATE AND VILANTEROL TRIFENATATE 1 PUFF: 100; 25 POWDER RESPIRATORY (INHALATION) at 10:41

## 2022-11-09 RX ADMIN — BISACODYL 10 MG: 10 SUPPOSITORY RECTAL at 13:23

## 2022-11-09 RX ADMIN — POLYETHYLENE GLYCOL 3350 17 G: 17 POWDER, FOR SOLUTION ORAL at 08:28

## 2022-11-09 RX ADMIN — SENNOSIDES AND DOCUSATE SODIUM 1 TABLET: 50; 8.6 TABLET ORAL at 13:24

## 2022-11-09 RX ADMIN — HYDROXYZINE HYDROCHLORIDE 25 MG: 25 TABLET, FILM COATED ORAL at 16:03

## 2022-11-09 RX ADMIN — HYDROMORPHONE HYDROCHLORIDE 4 MG: 2 TABLET ORAL at 11:58

## 2022-11-09 RX ADMIN — ACETAMINOPHEN 325 MG: 325 TABLET, FILM COATED ORAL at 19:26

## 2022-11-09 ASSESSMENT — ACTIVITIES OF DAILY LIVING (ADL)
ADLS_ACUITY_SCORE: 19

## 2022-11-09 NOTE — PLAN OF CARE
Physical Therapy Discharge Summary    Reason for therapy discharge:    Discharged to home.    Progress towards therapy goal(s). See goals on Care Plan in [facility] electronic health record for goal details.  Goals met    Therapy recommendation(s):    Continued therapy is recommended.  Rationale/Recommendations:  Outpatient therapy is recommended to improve functional mobility in order to return to PLOF.    Goal Outcome Evaluation:    Plan of Care Reviewed With: patient

## 2022-11-09 NOTE — PROGRESS NOTES
RN paged, pt requesting bowel med and none ordered  - added prn sennakot hs  - added prn Miralax daily prn    Tracie Weiner PA-C  [facility]  Contact information available via [facility] Paging/Directory

## 2022-11-09 NOTE — PROGRESS NOTES
"[facility], [place]   Internal Medicine Daily Note           Interval History/Events     Overnight events reviewed  Reports doing well  Pain controlled  Abdominal feels bloated, and feeling crampy pain sometimes  No nausea vomitin  No fever, chills         Review of Systems        4 point ROS including Respiratory, CV, GI and , other than that noted above is negative      Medications   I have reviewed current medications  in the \"current medication\" section of Epic.  Relevant changes include:     Physical Exam   General:       Vital signs:    Blood pressure 119/72, pulse 76, temperature 98.1  F (36.7  C), temperature source Oral, resp. rate 17, height 1.664 m (5' 5.51\"), weight 66.3 kg (146 lb 2.6 oz), SpO2 93 %, not currently breastfeeding.  Estimated body mass index is 23.94 kg/m  as calculated from the following:    Height as of this encounter: 1.664 m (5' 5.51\").    Weight as of this encounter: 66.3 kg (146 lb 2.6 oz).      Intake/Output Summary (Last 24 hours) at 11/8/2022 1417  Last data filed at 11/8/2022 0639  Gross per 24 hour   Intake 740 ml   Output 1350 ml   Net -610 ml        Constitutional: Laying in bed in no acute distress  Eye: No icterus, no pallor  Mouth/ENT: Normal oral mucosa  Cardiovascular: S1, S2 normal  Respiratory: B/L CTA  GI: Soft, NT, BS+  :   Neurology: Alert, awake, and oriented  Psych:   MSK:   Integumentary:   Heme/Lymph/Imm:      Laboratory and Imaging Studies     I have reviewed  laboratory and imaging studies in the Epic. Pertinent findings are as below:    BMP  Recent Labs   Lab 11/09/22  0602 11/08/22  0632 11/08/22  0546 11/07/22  1151   NA  --   --  139 136   POTASSIUM  --   --  4.0 4.3   CHLORIDE  --   --  102  --    KASIA  --   --  7.2*  --    CO2  --   --  28  --    BUN  --   --  7  --    CR  --   --  0.60  --    GLC 99 86 89 136*     CBC  Recent Labs   Lab 11/08/22  2126 11/08/22  0546 11/07/22  1429   WBC  --  9.3 12.0*   RBC  --  1.88* " 2.58*   HGB 9.4* 6.3* 8.6*   HCT  --  19.5* 25.9*   MCV  --  104* 100   MCH  --  33.5* 33.3*   MCHC  --  32.3 33.2   RDW  --  12.6 12.5   PLT  --  187 280     INRNo lab results found in last 7 days.  LFTsNo lab results found in last 7 days.   PANCNo lab results found in last 7 days.        Impression/Plan         Evaluation, Recommendations and Co-management of Medical Comorbidities.         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  By Dr Allan.   EBL: 900 CC  Complications: None.            PMH, Pre Op eval reviewed.  Comorbid conditions of hypertension, asthma, tobacco use.           # s/p spinal surgery on 11/08/2022:  Management primarily per Ortho team.  - Wound cares, Dressings, Surgical pain management, DVT Prophylaxis  per primary team.   - Monitor anemia, hemodynamics, Input/Output, Capnography (for 24 hours)  - Encourage Incentive spirometry  - Laxatives for constipation prophylaxis     # Anemia: Most likely due to  blood loss, and post surgical inflammation.   Pre op Hgb 12.5 .   Hg 6.3 gm/dl on 11/08. S/p  2 units PRBC Hg 9.4 gm/dl on 11/08  - Monitor       # Abdominal discomfort: No BM yet, passing gas intermittently.   - X ray abdomen to r/o obstruction  - Senna-docusate BID  - PEG daily  - Bisacodyl suppository daily     CVS:   No hx of CAD. Afib.   HTN: hold pta med 2.2 soft bp. Monitor. Resume as able.      Pulm:   Tobacco use:   Asthma: stable. Ct home inhalers.   -using advair daily and albuterol about once/week.   -She continues to smoke 1PPD. Encouraged decreased smoking, nicotine patch     GERD:   PONV:   currently with heartburn  - Continue  famotidine IV 20 mg bid   - tums prn  - antiemetics  prn     Psychiatry: mood stable.   - Continue pta fluoxetine.         Pt's care was discussed with bedside RN, patient and  during Care Team Rounds.               Juan Manuel Ruano MD  Hospitalist ( Internal medicine)  Pager: [phone]

## 2022-11-09 NOTE — PROGRESS NOTES
"   11/09/22 1315   Appointment Info   Signing Clinician's Name / Credentials (OT) Laurelfreddie Henson MS, OTR/L, CLT   Rehab Comments (OT) spinal precautions   Living Environment   People in Home significant other   Current Living Arrangements house   Home Accessibility stairs to enter home;stairs within home   Number of Stairs, Main Entrance 5   Stair Railings, Main Entrance railing on right side (ascending)   Number of Stairs, Within Home, Primary greater than 10 stairs   Stair Railings, Within Home, Primary railing on right side (ascending)   Transportation Anticipated family or friend will provide   Living Environment Comments Pt has a walk-in shower with a seat, no grab bars. Pt has standard height toilet.   Self-Care   Usual Activity Tolerance moderate   Current Activity Tolerance fair   Regular Exercise Yes   Activity/Exercise Type walking   Exercise Amount/Frequency daily   Equipment Currently Used at Home none   Fall history within last six months yes   Number of times patient has fallen within last six months 1   Activity/Exercise/Self-Care Comment Pt ind with ADL   Instrumental Activities of Daily Living (IADL)   IADL Comments SO can assist prn   General Information   Onset of Illness/Injury or Date of Surgery 11/07/22   Referring Physician Moe Juan MD   Patient/Family Therapy Goal Statement (OT) to go home, reduce pain   Additional Occupational Profile Info/Pertinent History of Current Problem Per chart: \"Pt presents with progressive thoracic myelopathy (gait imbalance).  Imaging revealed large thoracic disc herniation T6-7, with spinal cord compression/deformation.  Tried nonoperative treatment, continues to have significant disabling symptoms. Opted for surgical intervention on 11/8/22\"   Existing Precautions/Restrictions spinal   Left Upper Extremity (Weight-bearing Status) partial weight-bearing (PWB)  (no lifting > 10#)   Right Upper Extremity (Weight-bearing Status) partial " weight-bearing (PWB)  (no lifting > 10#)   Cognitive Status Examination   Orientation Status orientation to person, place and time   Sensory   Sensory Comments baseline n/t in hands from prior neck surgeries   Pain Assessment   Patient Currently in Pain Yes, see Vital Sign flowsheet   Range of Motion Comprehensive   Comment, General Range of Motion BUE WFL   Strength Comprehensive (MMT)   Comment, General Manual Muscle Testing (MMT) Assessment n/t 2/2 post op precautions   Bed Mobility   Comment (Bed Mobility) SBA   Transfers   Transfer Comments SBA FWW   Activities of Daily Living   BADL Assessment/Intervention lower body dressing;toileting   Lower Body Dressing Assessment/Training   Nixon Level (Lower Body Dressing) minimum assist (75% patient effort)   Toileting   [place] Level (Toileting) supervision   Clinical Impression   Criteria for Skilled Therapeutic Interventions Met (OT) Yes, treatment indicated   OT Diagnosis decreased I with ADL   OT Problem List-Impairments impacting ADL activity tolerance impaired;post-surgical precautions;pain   Assessment of Occupational Performance 1-3 Performance Deficits   Identified Performance Deficits dressing, toileting   Planned Therapy Interventions (OT) ADL retraining;transfer training   Clinical Decision Making Complexity (OT) low complexity   Risk & Benefits of therapy have been explained evaluation/treatment results reviewed;care plan/treatment goals reviewed;risks/benefits reviewed;current/potential barriers reviewed;participants voiced agreement with care plan;participants included;patient;spouse/significant other   Clinical Impression Comments Pt presents with impaired ADL 2/2 the above. Pt to benefit from skilled OT to address and maximize safety and I with ADL   OT Total Evaluation Time   OT Eval, Low Complexity Minutes (44936) 9   OT Goals   Therapy Frequency (OT) Daily   OT Predicted Duration/Target Date for Goal Attainment 11/10/22   OT Goals Lower  Body Dressing;Toilet Transfer/Toileting   OT: Lower Body Dressing Modified independent;within precautions   OT: Toilet Transfer/Toileting Modified independent;within precautions   OT Discharge Planning   OT Plan OT: review bathroom transfers, d/c   OT Discharge Recommendation (DC Rec) home with assist   OT Rationale for DC Rec Pt moving well with good supports in place. Ok for d/c home with A from SO once medically stable   OT Brief overview of current status SBA FWW   Total Session Time   Timed Code Treatment Minutes 25   Total Session Time (sum of timed and untimed services) 34

## 2022-11-09 NOTE — PLAN OF CARE
"  VS: /67   Pulse 78   Temp 99.9  F (37.7  C) (Oral)   Resp 18   Ht 1.664 m (5' 5.51\")   Wt 66.3 kg (146 lb 2.6 oz)   SpO2 98%   BMI 23.94 kg/m       O2: On oxygen 2.5 LPM. Denies SOB or chest pain. LS clear. IS encouraged.   Output: Olson catheter in place patent with adequate output.   LBM 11/8.   Activity: Pt sat at the edge of the bed, did not ambulate d/t pain.  Turns and repositions independently, assisted with boosting  up in bed.   Skin: Back incision dressing C/D/I, dressing reinforced d/t rolling up.  Hemo vac dressing changed due to drainage on dressing   Pain: Pain controlled with continuous ketamine, PRN oxycodone  Q3hr, robaxin and atarax.   CMS: Alert and oriented x4. Denies numbness/tingling   Dressing: Back incision dressing C/D/I, and hemo vac dressing changed.   Diet: Regular diet. Intermittent nausea, relieved with PRN Zofran and campazine   LDA: 2 PIVs left arm. Left forearm infusing continuous ketamine. Left wrist PIV saline locked.   Plan: Continue to monitor and follow POC.   Additional Info: Hemoglobin level 6.3 g/dL, pt received blood transfusion 2 units. Recheck after 2 hrs 9.4 g/dL.  Pt c/o heart burn, taking scheduled famotidine and PRN Tums.         "

## 2022-11-09 NOTE — PLAN OF CARE
Problem: Spinal Surgery  Goal: Optimal Functional Ability  Outcome: Progressing  Intervention: Optimize Functional Status  Recent Flowsheet Documentation  Taken 11/9/2022 0900 by Brittany Zuniga, RN  Activity Management: activity adjusted per tolerance  Positioning/Transfer Devices: pillows     Problem: Spinal Surgery  Goal: Optimal Pain Control and Function  Intervention: Prevent or Manage Pain  Recent Flowsheet Documentation  Taken 11/9/2022 0900 by Brittany Zuniga, RN  Pain Management Interventions: medication (see MAR)

## 2022-11-09 NOTE — PROGRESS NOTES
11/09/22 0800   Appointment Info   Signing Clinician's Name / Credentials (PT) MICHAELA Yost   Student Supervision On-site supervision provided;Therapy services provided with the co-signing licensed therapist guiding and directing the services, and providing the skilled judgement and assessment throughout the session   Living Environment   People in Home significant other  (boyfriend around 24/7)   Current Living Arrangements house   Home Accessibility stairs to enter home;stairs within home   Number of Stairs, Main Entrance 5   Stair Railings, Main Entrance railing on right side (ascending)   Number of Stairs, Within Home, Primary greater than 10 stairs  (to basement to get to washer and dryer)   Stair Railings, Within Home, Primary railing on right side (ascending)   Transportation Anticipated family or friend will provide   Living Environment Comments Walk in shower and bathroom on main floor, bedroom on main floor. Has shower chair   Self-Care   Usual Activity Tolerance moderate   Current Activity Tolerance fair   Regular Exercise Yes   Activity/Exercise Type walking;other (see comments)  (Pt able to get up and down stairs, clean house, but went much slower than normal and pt was cautious)   Exercise Amount/Frequency daily   Equipment Currently Used at Home none   Fall history within last six months yes   Number of times patient has fallen within last six months 1  (one fall in february which resulted in injury)   General Information   Onset of Illness/Injury or Date of Surgery 11/08/22   Referring Physician Evaristo Allan MD   Patient/Family Therapy Goals Statement (PT) Would like to be able to do activities ouitside again and go for walks.   Pertinent History of Current Problem (include personal factors and/or comorbidities that impact the POC) Per Epic EMR: Pt presents with progressive thoracic myelopathy (gait imbalance).  Imaging revealed large thoracic disc herniation T6-7, with  spinal cord compression/deformation.  Tried nonoperative treatment, continues to have significant disabling symptoms. Opted for surgical intervention on 11/8/22.   Existing Precautions/Restrictions fall;spinal   Weight-Bearing Status - LUE other (see comments)  (no greater than 10#)   Weight-Bearing Status - RUE other (see comments)  (No greater than 10#)   Weight-Bearing Status - LLE weight-bearing as tolerated   Weight-Bearing Status - RLE weight-bearing as tolerated   General Observations Pt supine in bed upon arrival, agreeable to PT.   Cognition   Affect/Mental Status (Cognition) WNL   Orientation Status (Cognition) oriented x 3   Follows Commands (Cognition) WNL   Cognitive Status Comments Cognition is WNL   Pain Assessment   Patient Currently in Pain Yes, see Vital Sign flowsheet   Integumentary/Edema   Integumentary/Edema other (describe)   Integumentary/Edema Comments Unable to observe incision due to dressing in place   Posture    Posture Protracted shoulders;Forward head position   Range of Motion (ROM)   Range of Motion ROM deficits secondary to surgical procedure;ROM deficits secondary to pain;ROM deficits secondary to weakness   ROM Comment ROM limited charmaine to spinal precautions   Strength (Manual Muscle Testing)   Strength (Manual Muscle Testing) Able to perform R SLR;Able to perform L SLR;strength is WFL   Strength Comments Pt states she feels weak, gait and transfers are slow, but able to complete transfers SBA and ambulate CGA-SBA.   Bed Mobility   Bed Mobility rolling left;scooting/bridging;supine-sit;sit-supine   Rolling Left Dixon (Bed Mobility) supervision;verbal cues   Scooting/Bridging Dixon (Bed Mobility) minimum assist (75% patient effort);verbal cues   Supine-Sit Dixon (Bed Mobility) supervision;verbal cues   Sit-Supine Dixon (Bed Mobility) supervision;verbal cues   Bed Mobility Limitations decreased ability to use arms for pushing/pulling;decreased ability to  use legs for bridging/pushing   Impairments Contributing to Impaired Bed Mobility pain;decreased ROM;decreased strength   Comment, (Bed Mobility) Pt has difficulty scooting up in bed due to decreased ability to push wiht legs and pull with arms   Transfers   Transfers sit-stand transfer   Maintains Weight-bearing Status (Transfers) able to maintain   Impairments Contributing to Impaired Transfers pain;decreased ROM;decreased strength   Sit-Stand Transfer   Sit-Stand Dane (Transfers) supervision   Assistive Device (Sit-Stand Transfers) walker, front-wheeled   Comment, (Sit-Stand Transfer) Pt able to complete STS transfers with SBA   Gait/Stairs (Locomotion)   Dane Level (Gait) contact guard   Assistive Device (Gait) walker, front-wheeled   Distance in Feet (Required for LE Total Joints) 15   Distance in Feet (Gait) 200x2   Pattern (Gait) step-to   Deviations/Abnormal Patterns (Gait) stride length decreased;weight shifting decreased;gait speed decreased;albina decreased   Maintains Weight-bearing Status (Gait) able to maintain   Comment, (Gait/Stairs) Pt able to maintain balance appropriately during ambulation with FWW   Balance   Balance no deficits were identified   Sensory Examination   Sensory Perception patient reports no sensory changes   Coordination   Coordination no deficits were identified   Muscle Tone   Muscle Tone no deficits were identified   Clinical Impression   Criteria for Skilled Therapeutic Intervention Yes, treatment indicated   PT Diagnosis (PT) LE global strength deficits, ROM deficits secondary to spinal precautions, impaired functional mobility.   Influenced by the following impairments spinal precautions, pain   Functional limitations due to impairments Inability to walk independently, inability to navigate stairs independently, inability to complete transfers independently   Clinical Presentation (PT Evaluation Complexity) Stable/Uncomplicated   Clinical Presentation  Rationale Pt's hemoglobin and hematocrit levels are more stable today, pt is able to ambulate approximately 300 ft with no supplemental O2 needed. Pt does not have any comorbidities that will substantially impact her recovery.   Clinical Decision Making (Complexity) low complexity   Planned Therapy Interventions (PT) bed mobility training;gait training;home exercise program;stair training;strengthening;neuromuscular re-education;transfer training   Anticipated Equipment Needs at Discharge (PT) walker, rolling  (Pt is unsure if she will require FWW at d/c, would like to reevaluate based on how she is feeling this afternoon)   Risk & Benefits of therapy have been explained evaluation/treatment results reviewed;care plan/treatment goals reviewed;risks/benefits reviewed;participants included;patient   PT Total Evaluation Time   PT Eval, Low Complexity Minutes (39761) 10   Plan of Care Review   Plan of Care Reviewed With patient   Physical Therapy Goals   PT Frequency 2x/day   PT Predicted Duration/Target Date for Goal Attainment 11/11/22   PT Goals Bed Mobility;Transfers;Gait;Stairs   PT: Bed Mobility Supervision/stand-by assist;Supine to/from sit;Rolling;Bridging;Within precautions   PT: Transfers Supervision/stand-by assist;Sit to/from stand;Within precautions   PT: Gait Supervision/stand-by assist;Rolling walker;Within precautions;Greater than 200 feet   PT: Stairs Greater than 10 stairs;Rail on right   Interventions   Interventions Quick Adds Gait Training;Therapeutic Activity   Therapeutic Activity   Therapeutic Activities: dynamic activities to improve functional performance Minutes (24382) 10   Symptoms Noted During/After Treatment Fatigue   Treatment Detail/Skilled Intervention PT: Arrived to pt room to pt supine in bed, agreeable to PT. Pt educated about spinal precautions and proper bed mobility using log rolling techinque. Supine <> sit x1 with SBA, no use of railing and HOB lowered to mimic home setup.  Scooting up in bed required HOB to be lowered and Kristopher due to LE weakness that limited pt's ability to bridge up and push off feet. Pt was able to stay within precautions without cueing required for all transfers. Sit <> stand x3 with FWW and SBA, no cueing required to keep one hand on bed and one on FWW. Pt left at end of session on 2L of O2, supine in bed with call light and immediate needs within reach.   Gait Training   Gait Training Minutes (76789) 25   Symptoms Noted During/After Treatment (Gait Training) increased pain;fatigue;shortness of breath   Treatment Detail/Skilled Intervention PT: Pt able to ambulate SBA- ftx2 with FWW. Pt was able to keep SpO2 above 90% for the majority of ambulation. When O2 dropped below 90%, pt was cued to slow down gait and take deep breaths in and out. Pt was able to recover O2 sats quickly. Pt took seated rest break on bench in between to relieve shortness of breath. Pt was stable during ambulation with no LOB, but walked with short stride length and slow albina.   PT Discharge Planning   PT Plan Increase gait distance, practice bed mobility (scooting up in bed), stairs   PT Discharge Recommendation (DC Rec) home with assist;home with outpatient physical therapy   PT Rationale for DC Rec Pt has boyfriend at home who is able to assist her 24/7.m Pt additionally only has 3 steps to enter her home, and has a walk in shower, bedroom and bathroom on the main floor. Her mobility is good, and is predominantely limited by her asthma and currently low HGB levels. PT will be able to improve walking endurance quickly as this improves. Pt may benefit from outpatient physical therapy in order to strengthen LE in order to get back to walking and doing activities outside.   PT Brief overview of current status Kristopher bed mobility, SBA sit <> stand, SBA-CGA gait   Total Session Time   Timed Code Treatment Minutes 35   Total Session Time (sum of timed and untimed services) 45

## 2022-11-09 NOTE — PROGRESS NOTES
Orthopaedic Surgery Progress Note 11/09/2022    S: No acute events overnight; s/p 2u pRBC yesterday. Pain well controlled. Some heartburn, would like to try PO dilaudid over PO oxycodone. Denies numbness or tingling. Denies chest pain, SOB, nausea/vomiting. Tolerating regular diet.  Voiding via manzanares.  Last BM PTA.    O:  Temp: 99  F (37.2  C) Temp src: Oral BP: 126/67 Pulse: 80   Resp: 16 SpO2: 96 % O2 Device: Nasal cannula Oxygen Delivery: 2.5 LPM    Exam:  Gen: No acute distress, resting comfortably in bed.  Resp: Non-labored breathing  MSK: Dressing CDI    Motor Strength Right Left   Hip flexion: L1, L2, L3 5/5 5/5   Hip adduction: L2, L3 5/5 5/5   Knee flexion: S1 5/5 5/5   Knee extension: L3, L4 5/5 5/5   Ankle dosiflexion: L4, L5 5/5 5/5   EHL: L5 5/5 5/5   Ankle plantarflexion: S1 5/5 5/5     Sensation from L1-S2 is preserved.    Output by Drain (mL) 11/07/22 0700 - 11/07/22 1459 11/07/22 1500 - 11/07/22 2259 11/07/22 2300 - 11/08/22 0659 11/08/22 0700 - 11/08/22 1459 11/08/22 1500 - 11/08/22 2259 11/08/22 2300 - 11/09/22 0633   Closed/Suction Drain 1 Right;Midline Back Accordion 10 Ukrainian placed to the upper right of midline incision - DEEP  255 95   15     Recent Labs   Lab 11/08/22 2126 11/08/22  0546 11/07/22  1429   WBC  --  9.3 12.0*   HGB 9.4* 6.3* 8.6*   PLT  --  187 280     Assessment: Emeli Beyer is a 57 year old female s/p Procedure(s):  Extended Pedicle Subtraction Osteotomy Thoracic 7 with Expandable Cage Reconstruction, Posterior Spinal Fusion Thoracic 5 to Thoracic 9, performed secondary to 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 Allan     11/09/22   PT/OT  Pain control  Continue drain  Discontinue manzanares  PO dilaudid    Ortho  Activity: Up with assist; No excessive bending or twisting; no lifting > 10 pounds x 6 weeks  Weight bearing status: WBAT  Antibiotics: Ancef x 24h  Diet: Begin with clear fluids and progress diet as tolerated;  Bowel regimen  DVT prophylaxis: mechanical only, no chemical DVT PPx needed on d/c  Bracing/Splinting: no brace needed  Wound Care: Aquacel dressing to remain in place x 7-10 days  Drains: Document output per shift  Pain management: transition from IV to orals as tolerated.  Olson: until POD 1, then trial void per protocol  X-rays: Upright thoracic XR; thoracic CT prior to discharge - ordered  Physical Therapy: gait training, mobilization ADLs.  Labs: Trend Hgb on POD #1, 2, 3 .   Consults: PT/OT, Hospitalist appreciate assistance in caring for this patient.  Follow-up: Clinic with Dr. Allan @ 6 weeks post op     Disposition: Pending progress with therapies, pain control on orals, and medical stability, anticipate discharge to Home vs Rehab on POD #3-4.     Moe Juan MD PGY4  Orthopaedic Surgery

## 2022-11-09 NOTE — PROGRESS NOTES
A&Ox4, calm and cooperative, able to make needs known.  VSS on 2.5L O2 via NC, manzanares in place moderate output, NBM this shift.  Denied SOB, chest pain, n/v, n/t.  LPIV x2 one SL and one running ketamine 5mg/hr.  Back incision dressing CDI, hemovac had no output overnight; dressing CDI.  Pain managed with oxy, tylenol, robaxin, atarax, and ice packs. Not oob this shift, changed position independently.  Continuous pulse ox on, sats remained around low-mid 90's.  Call light within reach.  Continue with POC.

## 2022-11-10 ENCOUNTER — APPOINTMENT (OUTPATIENT)
Dept: OCCUPATIONAL THERAPY | Facility: CLINIC | Age: 57
End: 2022-11-10
Attending: ORTHOPAEDIC SURGERY
Payer: COMMERCIAL

## 2022-11-10 LAB
ANION GAP SERPL CALCULATED.3IONS-SCNC: 11 MMOL/L (ref 3–14)
BUN SERPL-MCNC: 4 MG/DL (ref 7–30)
CALCIUM SERPL-MCNC: 7.6 MG/DL (ref 8.5–10.1)
CHLORIDE BLD-SCNC: 101 MMOL/L (ref 94–109)
CO2 SERPL-SCNC: 26 MMOL/L (ref 20–32)
CREAT SERPL-MCNC: 0.56 MG/DL (ref 0.52–1.04)
GFR SERPL CREATININE-BSD FRML MDRD: >90 ML/MIN/1.73M2
GLUCOSE BLD-MCNC: 89 MG/DL (ref 70–99)
HOLD SPECIMEN: NORMAL
MAGNESIUM SERPL-MCNC: 2.2 MG/DL (ref 1.6–2.3)
PHOSPHATE SERPL-MCNC: 2.2 MG/DL (ref 2.5–4.5)
POTASSIUM BLD-SCNC: 3.4 MMOL/L (ref 3.4–5.3)
SODIUM SERPL-SCNC: 138 MMOL/L (ref 133–144)

## 2022-11-10 PROCEDURE — 250N000013 HC RX MED GY IP 250 OP 250 PS 637

## 2022-11-10 PROCEDURE — 97530 THERAPEUTIC ACTIVITIES: CPT | Mod: GO

## 2022-11-10 PROCEDURE — 120N000002 HC R&B MED SURG/OB UMMC

## 2022-11-10 PROCEDURE — 99232 SBSQ HOSP IP/OBS MODERATE 35: CPT | Performed by: INTERNAL MEDICINE

## 2022-11-10 PROCEDURE — 80048 BASIC METABOLIC PNL TOTAL CA: CPT | Performed by: INTERNAL MEDICINE

## 2022-11-10 PROCEDURE — 250N000013 HC RX MED GY IP 250 OP 250 PS 637: Performed by: INTERNAL MEDICINE

## 2022-11-10 PROCEDURE — 250N000013 HC RX MED GY IP 250 OP 250 PS 637: Performed by: PHYSICIAN ASSISTANT

## 2022-11-10 PROCEDURE — 250N000013 HC RX MED GY IP 250 OP 250 PS 637: Performed by: STUDENT IN AN ORGANIZED HEALTH CARE EDUCATION/TRAINING PROGRAM

## 2022-11-10 PROCEDURE — 83735 ASSAY OF MAGNESIUM: CPT | Performed by: INTERNAL MEDICINE

## 2022-11-10 PROCEDURE — 84100 ASSAY OF PHOSPHORUS: CPT | Performed by: INTERNAL MEDICINE

## 2022-11-10 PROCEDURE — 97535 SELF CARE MNGMENT TRAINING: CPT | Mod: GO

## 2022-11-10 PROCEDURE — 36415 COLL VENOUS BLD VENIPUNCTURE: CPT | Performed by: INTERNAL MEDICINE

## 2022-11-10 RX ORDER — HYDROXYZINE HYDROCHLORIDE 25 MG/1
25 TABLET, FILM COATED ORAL EVERY 6 HOURS PRN
Qty: 40 TABLET | Refills: 0 | Status: SHIPPED | OUTPATIENT
Start: 2022-11-10 | End: 2022-11-10

## 2022-11-10 RX ORDER — HYDROXYZINE HYDROCHLORIDE 25 MG/1
25 TABLET, FILM COATED ORAL EVERY 6 HOURS PRN
Qty: 40 TABLET | Refills: 0 | Status: SHIPPED | OUTPATIENT
Start: 2022-11-10 | End: 2022-11-29

## 2022-11-10 RX ORDER — HYDROMORPHONE HYDROCHLORIDE 2 MG/1
2-4 TABLET ORAL
Qty: 36 TABLET | Refills: 0 | Status: SHIPPED | OUTPATIENT
Start: 2022-11-10 | End: 2022-11-14

## 2022-11-10 RX ORDER — AMOXICILLIN 250 MG
1 CAPSULE ORAL 2 TIMES DAILY
Qty: 30 TABLET | Refills: 0 | Status: SHIPPED | OUTPATIENT
Start: 2022-11-10

## 2022-11-10 RX ORDER — METHOCARBAMOL 750 MG/1
750 TABLET, FILM COATED ORAL EVERY 6 HOURS PRN
Qty: 30 TABLET | Refills: 0 | Status: SHIPPED | OUTPATIENT
Start: 2022-11-10 | End: 2022-11-29

## 2022-11-10 RX ORDER — ACETAMINOPHEN 325 MG/1
650 TABLET ORAL EVERY 4 HOURS PRN
Qty: 60 TABLET | Refills: 0 | Status: SHIPPED | OUTPATIENT
Start: 2022-11-10 | End: 2022-11-29

## 2022-11-10 RX ORDER — AMOXICILLIN 250 MG
1 CAPSULE ORAL 2 TIMES DAILY
Qty: 30 TABLET | Refills: 0 | Status: SHIPPED | OUTPATIENT
Start: 2022-11-10 | End: 2022-11-10

## 2022-11-10 RX ORDER — DOCUSATE SODIUM 100 MG/1
100-200 CAPSULE, LIQUID FILLED ORAL 2 TIMES DAILY
Qty: 30 CAPSULE | Refills: 0 | Status: SHIPPED | OUTPATIENT
Start: 2022-11-10 | End: 2022-11-10

## 2022-11-10 RX ORDER — METHOCARBAMOL 750 MG/1
750 TABLET, FILM COATED ORAL EVERY 6 HOURS PRN
Qty: 30 TABLET | Refills: 0 | Status: SHIPPED | OUTPATIENT
Start: 2022-11-10 | End: 2022-11-10

## 2022-11-10 RX ORDER — POLYETHYLENE GLYCOL 3350 17 G/17G
17 POWDER, FOR SOLUTION ORAL DAILY
Qty: 10 PACKET | Refills: 0 | Status: SHIPPED | OUTPATIENT
Start: 2022-11-11

## 2022-11-10 RX ORDER — POLYETHYLENE GLYCOL 3350 17 G/17G
17 POWDER, FOR SOLUTION ORAL 2 TIMES DAILY
Status: DISCONTINUED | OUTPATIENT
Start: 2022-11-10 | End: 2022-11-11 | Stop reason: HOSPADM

## 2022-11-10 RX ORDER — POLYETHYLENE GLYCOL 3350 17 G/17G
17 POWDER, FOR SOLUTION ORAL DAILY
Qty: 10 PACKET | Refills: 0 | Status: SHIPPED | OUTPATIENT
Start: 2022-11-11 | End: 2022-11-10

## 2022-11-10 RX ORDER — HYDROMORPHONE HYDROCHLORIDE 2 MG/1
2-4 TABLET ORAL
Qty: 36 TABLET | Refills: 0 | Status: SHIPPED | OUTPATIENT
Start: 2022-11-10 | End: 2022-11-10

## 2022-11-10 RX ORDER — ACETAMINOPHEN 325 MG/1
975 TABLET ORAL EVERY 8 HOURS
Status: DISCONTINUED | OUTPATIENT
Start: 2022-11-10 | End: 2022-11-11 | Stop reason: HOSPADM

## 2022-11-10 RX ORDER — ACETAMINOPHEN 325 MG/1
650 TABLET ORAL EVERY 4 HOURS PRN
Qty: 60 TABLET | Refills: 0 | Status: SHIPPED | OUTPATIENT
Start: 2022-11-10 | End: 2022-11-10

## 2022-11-10 RX ORDER — DOCUSATE SODIUM 100 MG/1
100-200 CAPSULE, LIQUID FILLED ORAL 2 TIMES DAILY
Qty: 30 CAPSULE | Refills: 0 | Status: SHIPPED | OUTPATIENT
Start: 2022-11-10

## 2022-11-10 RX ADMIN — POLYETHYLENE GLYCOL 3350 17 G: 17 POWDER, FOR SOLUTION ORAL at 08:00

## 2022-11-10 RX ADMIN — HYDROMORPHONE HYDROCHLORIDE 4 MG: 2 TABLET ORAL at 12:39

## 2022-11-10 RX ADMIN — HYDROXYZINE HYDROCHLORIDE 25 MG: 25 TABLET, FILM COATED ORAL at 04:08

## 2022-11-10 RX ADMIN — ACETAMINOPHEN 975 MG: 325 TABLET, FILM COATED ORAL at 23:03

## 2022-11-10 RX ADMIN — HYDROMORPHONE HYDROCHLORIDE 4 MG: 2 TABLET ORAL at 09:37

## 2022-11-10 RX ADMIN — POTASSIUM & SODIUM PHOSPHATES POWDER PACK 280-160-250 MG 1 PACKET: 280-160-250 PACK at 19:26

## 2022-11-10 RX ADMIN — SENNOSIDES AND DOCUSATE SODIUM 1 TABLET: 50; 8.6 TABLET ORAL at 08:00

## 2022-11-10 RX ADMIN — HYDROMORPHONE HYDROCHLORIDE 2 MG: 2 TABLET ORAL at 05:49

## 2022-11-10 RX ADMIN — PROCHLORPERAZINE MALEATE 5 MG: 5 TABLET ORAL at 05:49

## 2022-11-10 RX ADMIN — HYDROMORPHONE HYDROCHLORIDE 2 MG: 2 TABLET ORAL at 01:19

## 2022-11-10 RX ADMIN — ACETAMINOPHEN 975 MG: 325 TABLET, FILM COATED ORAL at 16:20

## 2022-11-10 RX ADMIN — METHOCARBAMOL 750 MG: 750 TABLET ORAL at 00:08

## 2022-11-10 RX ADMIN — NICOTINE 1 PATCH: 21 PATCH, EXTENDED RELEASE TRANSDERMAL at 21:44

## 2022-11-10 RX ADMIN — HYDROMORPHONE HYDROCHLORIDE 2 MG: 2 TABLET ORAL at 23:08

## 2022-11-10 RX ADMIN — POTASSIUM & SODIUM PHOSPHATES POWDER PACK 280-160-250 MG 1 PACKET: 280-160-250 PACK at 16:21

## 2022-11-10 RX ADMIN — DOCUSATE SODIUM 100 MG: 100 CAPSULE, LIQUID FILLED ORAL at 08:00

## 2022-11-10 RX ADMIN — BISACODYL 10 MG: 10 SUPPOSITORY RECTAL at 10:50

## 2022-11-10 RX ADMIN — FLUOXETINE HYDROCHLORIDE 10 MG: 10 CAPSULE ORAL at 21:41

## 2022-11-10 RX ADMIN — METHOCARBAMOL 750 MG: 750 TABLET ORAL at 19:28

## 2022-11-10 RX ADMIN — HYDROMORPHONE HYDROCHLORIDE 4 MG: 2 TABLET ORAL at 17:53

## 2022-11-10 RX ADMIN — METHOCARBAMOL 750 MG: 750 TABLET ORAL at 08:12

## 2022-11-10 RX ADMIN — POLYETHYLENE GLYCOL 3350 17 G: 17 POWDER, FOR SOLUTION ORAL at 19:26

## 2022-11-10 RX ADMIN — FLUTICASONE FUROATE AND VILANTEROL TRIFENATATE 1 PUFF: 100; 25 POWDER RESPIRATORY (INHALATION) at 08:02

## 2022-11-10 RX ADMIN — SENNOSIDES AND DOCUSATE SODIUM 1 TABLET: 50; 8.6 TABLET ORAL at 19:28

## 2022-11-10 ASSESSMENT — ACTIVITIES OF DAILY LIVING (ADL)
ADLS_ACUITY_SCORE: 19

## 2022-11-10 NOTE — PLAN OF CARE
"  VS: /65 (BP Location: Left arm)   Pulse 74   Temp 99.5  F (37.5  C) (Oral)   Resp 16   Ht 1.664 m (5' 5.51\")   Wt 66.3 kg (146 lb 2.6 oz)   SpO2 94%   BMI 23.94 kg/m       O2: On oxygen 1 LPM. Denies SOB or chest pain. LS clear. IS encouraged.   Output: Voiding without difficulties. PVR 72 ml.   Had a BM today.   Activity: Up with SBA with a walker and gait belt.   Skin: Back incision dressing C/D/I   Pain: Pain controlled well with PRN dilaudid Q3hr, robaxin and atarax.   CMS: Alert and oriented x4. Denies numbness/tingling   Dressing: Back incision dressing C/D/I  Old Hemo vac site dressing changed.    Diet: Regular diet. Denies nausea/vomiting and abdominal discomfort.   LDA: 2 PIVs left arm. Left wrist PIV infusing NS at 100 ml/hr and Left fore arm saline locked.   Plan: Continue to monitor and follow POC.   Additional Info: Pt abdominal x-ray results back. See results.  Plan: NPO ex for meds and ice chips  Notify provider if recurrent nausea, vomiting, abdominal distention, or abdominal pain.         "

## 2022-11-10 NOTE — CARE PLAN
Occupational Therapy Discharge Summary    Reason for therapy discharge:    All goals and outcomes met, no further needs identified.    Progress towards therapy goal(s). See goals on Care Plan in [facility] electronic health record for goal details.  Goals met    Therapy recommendation(s):    No further therapy is recommended.

## 2022-11-10 NOTE — PROGRESS NOTES
A&Ox4, calm and cooperative, able to make needs known.  VSS on 1LO2 via nasal cannula.  SBA with walker and gait belt, pt ambulated well to the bathroom.  Pain managed with robaxin, dilaudid, atarax, ice packs.  Dressings on back from both spinal incision site and old drain site are CDI.  Denied SOB, chest pain, n/t.  C/o nausea, given compazine.  2 LPIV's left forearm SL and L wrist infusing NS 100ml/hr.  Pt on NPO status anticipating need for surgery d/t x-ray results.  Notify provider if recurrent n/v, abdominal distention/pain.  Provider was notified this shift of increased abdominal pain with absent bowel sounds and firm abdomen.  Continue with POC.

## 2022-11-10 NOTE — PROGRESS NOTES
"[facility], [place]   Internal Medicine Daily Note           Interval History/Events     Overnight events reviewed  Reports some abdominal pain, had small BM this morning at 6 PM. Intermittent nausea, with no vomiting  No fever, chills  No lightheadedness or dizizness.        Review of Systems        4 point ROS including Respiratory, CV, GI and , other than that noted above is negative      Medications   I have reviewed current medications  in the \"current medication\" section of Epic.  Relevant changes include:     Physical Exam   General:       Vital signs:    Blood pressure 121/69, pulse 86, temperature 99.5  F (37.5  C), temperature source Oral, resp. rate 18, height 1.664 m (5' 5.51\"), weight 66.3 kg (146 lb 2.6 oz), SpO2 92 %, not currently breastfeeding.  Estimated body mass index is 23.94 kg/m  as calculated from the following:    Height as of this encounter: 1.664 m (5' 5.51\").    Weight as of this encounter: 66.3 kg (146 lb 2.6 oz).      Intake/Output Summary (Last 24 hours) at 11/8/2022 1417  Last data filed at 11/8/2022 0639  Gross per 24 hour   Intake 740 ml   Output 1350 ml   Net -610 ml        Constitutional: Laying in bed in no acute distress  Eye: No icterus, no pallor  Mouth/ENT: Normal oral mucosa  Cardiovascular: S1, S2 normal  Respiratory: B/L CTA  GI: Soft, mildly distended, Mildly tender through out  :   Neurology: Alert, awake, and oriented  Psych:   MSK:   Integumentary:   Heme/Lymph/Imm:      Laboratory and Imaging Studies     I have reviewed  laboratory and imaging studies in the Epic. Pertinent findings are as below:    BMP  Recent Labs   Lab 11/10/22  1019 11/09/22  0602 11/08/22  0632 11/08/22  0546 11/07/22  1151     --   --  139 136   POTASSIUM 3.4  --   --  4.0 4.3   CHLORIDE 101  --   --  102  --    KASIA 7.6*  --   --  7.2*  --    CO2 26  --   --  28  --    BUN 4*  --   --  7  --    CR 0.56  --   --  0.60  --    GLC 89 99 86 89 136* "     CBC  Recent Labs   Lab 11/08/22  2126 11/08/22  0546 11/07/22  1429   WBC  --  9.3 12.0*   RBC  --  1.88* 2.58*   HGB 9.4* 6.3* 8.6*   HCT  --  19.5* 25.9*   MCV  --  104* 100   MCH  --  33.5* 33.3*   MCHC  --  32.3 33.2   RDW  --  12.6 12.5   PLT  --  187 280     INRNo lab results found in last 7 days.  LFTsNo lab results found in last 7 days.   PANCNo lab results found in last 7 days.        Impression/Plan         Evaluation, Recommendations and Co-management of Medical Comorbidities.         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  By Dr Allan.   EBL: 900 CC  Complications: None.            PMH, Pre Op eval reviewed.  Comorbid conditions of hypertension, asthma, tobacco use.           # s/p spinal surgery on 11/08/2022:  Management primarily per Ortho team.  - Wound cares, Dressings, Surgical pain management, DVT Prophylaxis  per primary team.   - Monitor anemia, hemodynamics, Input/Output, Capnography (for 24 hours)  - Encourage Incentive spirometry  - Laxatives for constipation prophylaxis     # Anemia: Most likely due to  blood loss, and post surgical inflammation.   Pre op Hgb 12.5 .   Hg 6.3 gm/dl on 11/08. S/p  2 units PRBC Hg 9.4 gm/dl on 11/08  - Monitor       # Post op ileus:  No BM yet, passing gas intermittently.   Had 2 BM . One on 11/09, and small one on 11/10. X ray abdomen was done which showed finding suggestive of ileus vs SBO. . Dilated large bowel measuring up to 9 cm  including the ascending, transverse and proximal descending colon GI consulted for further evaluation.   Patient reports some intermittent pain. Otherwise reports doing well. No nausea, vomiting  - Check electrolytes  - Avoid/minimize  narcotics  - Senna-docusate BID  - PEG BID  - Bisacodyl suppository daily  - Follow up GI consult     CVS:   No hx of CAD. Afib.   HTN: hold pta med 2.2 soft bp. Monitor. Resume as able.      Pulm:   Tobacco use:   Asthma: stable. Ct home inhalers.   -using advair daily and albuterol about once/week.   -She continues to smoke 1PPD. Encouraged decreased smoking, nicotine patch     GERD:   PONV:   currently with heartburn  - Continue  famotidine IV 20 mg bid   - tums prn  - antiemetics prn     Psychiatry: mood stable.   - Continue pta fluoxetine.         Pt's care was discussed with bedside RN, patient and  during Care Team Rounds.               Juan Manuel Ruano MD  Hospitalist ( Internal medicine)  Pager: [phone]

## 2022-11-10 NOTE — PROGRESS NOTES
"Medicine Crosscover Note    Asked to follow up on AXR by primary team which was obtained due to post-op abdominal discomfort. AXR showed large bowel dilation measuring up to 9 cm with apparent transition point in proximal descending colon and featureless distal descending colon most likely r/t chronic ileus rather than partial large bowel obstruction.    Met with patient who reports BM this afternoon. Nausea and abdominal pain have resolved. Tolerating PO intake. Vomited a few days ago, but not since. She reports chronic bowel issues related to a \"botched hysterectomy\" that required bowel surgery. Unable to find records in Care Everywhere at this time. She reports she keeps her stools loose at baseline as she is otherwise unable to use the bathroom. She states she has been evaluated by surgeons in the past who have declined to further operate.    /65 (BP Location: Left arm)   Pulse 74   Temp 99.5  F (37.5  C) (Oral)   Resp 16   Ht 1.664 m (5' 5.51\")   Wt 66.3 kg (146 lb 2.6 oz)   SpO2 94%   BMI 23.94 kg/m    General: Awake. Non-toxic appearing. NAD.  GI: Abdomen is soft, non-tender, and non-distended.    Assessment and Plan:  #Chronic Ileus: Likely related to prior complication from hysterectomy and exacerbated in acute post-op setting (immobility, opiates). Last BM 11/9. Abdominal exam benign. No nausea, vomiting, or abdominal pain. Tolerating PO intake. Clinically well appearing.  - NPO except for meds and ice chips  - Resume IVF at 100 mL/hr  - Increase Colace to BID  - Continue Miralax daily and Senna-Docusate BID  - Notify provider if recurrent nausea, vomiting, abdominal distention, or abdominal pain.    Primary team to reassess next steps in AM. Case was d/w Dr. Henson.    Fanny Albarado PA-C  Hospitalist Service  "

## 2022-11-10 NOTE — CONSULTS
GASTROENTEROLOGY CONSULTATION      Date of Admission:  11/7/2022           Reason for Consultation:   We were asked by Dr. Ruano to evaluate this patient with ileus           ASSESSMENT AND RECOMMENDATIONS:   Assessment:  57 year old female with prior hysterectomy, appendectomy, HTN, asthma, admitted for thoracic spinal surgery, GI consulted in the setting of concern for ileus.    # Ileus  Patient with minimal BMs since surgery (1 small one yesterday and 2 today) in the setting of extensive opioid use post-op and minimal bowel regimen. This in the setting of patient with chronic constipation on daily Miralax at home which was not continued here upon admission. Colonic diameter 9 cm, which is under threshold for significant concern for perforation. Would treat conservatively at this point with decreasing opioids, providing bowel regimen, ambulating, and ensure electrolytes are WNL. Should this fail, then can consider more aggressive therapies such as Relistor.     Note transition point in the descending colon - Radiology feels this still most likely represents ileus. However, given long time since adequate colonoscopy (poor prep in 2015), she should also undergo colonoscopy as an outpatient once recovered from her spinal surgery to exclude mechanical lesion contributing to her presentation.      Recommendations  - Check extended electrolytes (Na/K/phos/Mg)  - start Miralax BID - this should be continued for the duration of her opioid therapy inpatient/outpatient, then she can go back to PTA once daily dosing  - Stop Colace  - Continue Dulcolax suppositories  - Increase Tylenol to 4 g daily to help minimize opioids  - Stop opioids as soon as clinically able  - Ambulate to stimulate bowel activity  - Diet as tolerated is OK from GI standpoint  - She needs outpatient colonoscopy (poor prep in 2015) once she has recovered from her spinal surgery - this can be ordered by PCP once she has recovered, and should be  "ordered with an extended/2 day prep      Thank you for involving us in this patient's care. Please do not hesitate to contact the GI service with any questions or concerns.     Pt care plan discussed with Dr. Fam, GI staff physician.    Sanjay Garcia MD  -------------------------------------------------------------------------------------------------------------------           History of Present Illness:   Emeli Beyer is a 57 year old female with prior hysterectomy, appendectomy, HTN, asthma, admitted for thoracic spinal surgery, GI consulted in the setting of concern for ileus.    Patient describes baseline chronic constipation, takes Miralax daily at home (BID if noting worsening constipation), along with PRN \"laxatives\" but not sure what these are. Since her surgery 11/8, she had not had a bowel movement at all until yesterday afternoon when she had a small liquid one, and then two more small liquid ones over the course of today. Constipation has been accompanied by cramping, migratory lower abdominal pain. No blood in her stool now or previously.     She tried eating some soup yesterday but this made her bloating worse so she was made NPO. This was before she had her bowel movements.     Labs not being checked since 11/8.    Abdominal X-ray 11/9 with 9 cm colon and transition point in the descending colon.     Receiving minimal Tylenol. Fentanyl 300 mg day of operation along with drip. Has been receiving regular dilaudid (16 mg 11/9, 6 mg so far today). Robaxin. Oxycodone.     Bisacodyl suppository 11/9. Docusate 11/9 and 11/10.     Prior colonoscopy through [facility]:    1. Poor prep, i.e. 20-30% of the colonic mucosal surface simply could not be visualized because of semi solid stool, seeds, and vegetable material.  2. Normal cecal cup that could be visualized, although there was significant particulate matter within the cecal cup as well.  3. Normal appearing valve and distal terminal ileum.  4. Unable to " obtain optimal or proper exam of the entire colon due to the fact that the extensive semi solid stool was present which was not amenable to irrigation and aspiration.  5. While no large polyps or tumors were seen, today's exam is incomplete and indeed it is certainly possible that pathology could be obscured under the areas that could not be seen, including the possibility of any colon polyps or cancers, and indeed patient is informed of these possibilities.  6. Recommended for now that the patient have a followup colonoscopy with a 2-day prep. Her other option would be to undertake CT colonography or barium enema. She is urged to follow up with Dr. Dean Nissen for her continued care, to discuss the options, and she is discharged with her vitals deemed stable.              Past Medical History:   Reviewed and edited as appropriate  Past Medical History:   Diagnosis Date     Asthma      HTN (hypertension)             Past Surgical History:   Reviewed and edited as appropriate   Past Surgical History:   Procedure Laterality Date     APPENDECTOMY       HYSTERECTOMY       OPTICAL TRACKING SYSTEM FUSION POSTERIOR SPINE THORACIC ONE LEVEL N/A 11/7/2022    Procedure: Extended Pedicle Subtraction Osteotomy Thoracic 7 with Expandable Cage Reconstruction, Posterior Spinal Fusion Thoracic 5 to Thoracic 9;  Surgeon: Evaristo Allan MD;  Location: [facility] OR     ORTHOPEDIC SURGERY      multiple previous spine            Previous Endoscopy:   No results found for this or any previous visit.         Social History:   Reviewed and edited as appropriate  Social History     Socioeconomic History     Marital status: Single     Spouse name: Not on file     Number of children: Not on file     Years of education: Not on file     Highest education level: Not on file   Occupational History     Not on file   Tobacco Use     Smoking status: Every Day     Packs/day: 1.00     Types: Cigarettes     Smokeless tobacco: Never   Substance and  Sexual Activity     Alcohol use: Yes     Alcohol/week: 2.0 standard drinks     Types: 2 Standard drinks or equivalent per week     Drug use: Not Currently     Sexual activity: Not on file   Other Topics Concern     Not on file   Social History Narrative     Not on file     Social Determinants of Health     Financial Resource Strain: Not on file   Food Insecurity: Not on file   Transportation Needs: Not on file   Physical Activity: Not on file   Stress: Not on file   Social Connections: Not on file   Intimate Partner Violence: Not on file   Housing Stability: Not on file            Family History:   Reviewed and edited as appropriate  Family History   Problem Relation Age of Onset     Post Operative Nausea and Vomiting Mother      Deep Vein Thrombosis (DVT) No family hx of      No known history of colorectal cancer, liver disease, or inflammatory bowel disease.         Allergies:   Reviewed and edited as appropriate     Allergies   Allergen Reactions     Penicillins Rash and Shortness Of Breath     Penicillins     Clindamycin Rash     Naproxen Rash     Anaprox TABS            Medications:     Current Facility-Administered Medications   Medication     acetaminophen (TYLENOL) tablet 325 mg     albuterol (PROVENTIL) neb solution 2.5 mg     bisacodyl (DULCOLAX) suppository 10 mg     calcium carbonate (TUMS) chewable tablet 500-1,000 mg     docusate sodium (COLACE) capsule 100-200 mg     FLUoxetine (PROzac) capsule 10 mg     fluticasone-vilanterol (BREO ELLIPTA) 100-25 MCG/ACT inhaler 1 puff     HYDROmorphone (DILAUDID) tablet 2-4 mg     hydrOXYzine (ATARAX) tablet 25 mg    Or     hydrOXYzine (ATARAX) tablet 50 mg     lidocaine (LMX4) cream     lidocaine 1 % 0.1-1 mL     methocarbamol (ROBAXIN) tablet 750 mg     naloxone (NARCAN) injection 0.2 mg    Or     naloxone (NARCAN) injection 0.4 mg    Or     naloxone (NARCAN) injection 0.2 mg    Or     naloxone (NARCAN) injection 0.4 mg     nicotine (NICODERM CQ) 21 MG/24HR 24 hr  "patch 1 patch     nicotine Patch in Place     ondansetron (ZOFRAN) injection 4 mg     polyethylene glycol (MIRALAX) Packet 17 g     prochlorperazine (COMPAZINE) injection 5 mg    Or     prochlorperazine (COMPAZINE) tablet 5 mg    Or     prochlorperazine (COMPAZINE) suppository 25 mg     senna-docusate (SENOKOT-S/PERICOLACE) 8.6-50 MG per tablet 1 tablet     sodium chloride (PF) 0.9% PF flush 3 mL     sodium chloride (PF) 0.9% PF flush 3 mL     sodium chloride 0.9% infusion             Review of Systems:     A complete 10 point review of systems was performed and is negative except as noted in the HPI           Physical Exam:   /77 (BP Location: Left arm)   Pulse 81   Temp 98.9  F (37.2  C) (Oral)   Resp 16   Ht 1.664 m (5' 5.51\")   Wt 66.3 kg (146 lb 2.6 oz)   SpO2 100%   BMI 23.94 kg/m    Wt:   Wt Readings from Last 2 Encounters:   11/07/22 66.3 kg (146 lb 2.6 oz)   10/14/22 67.3 kg (148 lb 6.4 oz)      Constitutional: No acute distress, resting comfortably in bed  Eyes: Sclera anicteric  Ears/nose/mouth/throat: Moist mucus membranes, hearing intact  Neck: supple  CV: No edema  Respiratory: Breathing comfortably on room air  Abd: Soft, mildly tender to palpation on the suprapubic region/mid lower abdomen, mildly distended, bowel sounds hypoactive but present  Skin: warm, perfused, no jaundice  Neuro: AAO x 3  Psych: Normal affect  MSK: No gross deformities         Data:   Labs and imaging below were independently reviewed and interpreted    BMP  Recent Labs   Lab 11/09/22  0602 11/08/22  0632 11/08/22  0546 11/07/22  1151   NA  --   --  139 136   POTASSIUM  --   --  4.0 4.3   CHLORIDE  --   --  102  --    KASIA  --   --  7.2*  --    CO2  --   --  28  --    BUN  --   --  7  --    CR  --   --  0.60  --    GLC 99 86 89 136*     CBC  Recent Labs   Lab 11/08/22 2126 11/08/22  0546 11/07/22  1429   WBC  --  9.3 12.0*   RBC  --  1.88* 2.58*   HGB 9.4* 6.3* 8.6*   HCT  --  19.5* 25.9*   MCV  --  104* 100   MCH  " --  33.5* 33.3*   MCHC  --  32.3 33.2   RDW  --  12.6 12.5   PLT  --  187 280     INRNo lab results found in last 7 days.  LFTsNo lab results found in last 7 days.   PANCNo lab results found in last 7 days.

## 2022-11-10 NOTE — PROGRESS NOTES
Orthopaedic Surgery Progress Note 11/10/2022    S: NPO for presumed chronic ileus. Pain well controlled. BM yesterday, denies abdominal pain or N/V this AM. Denies numbness or tingling. Denies chest pain, SOB, nausea/vomiting. Tolerating regular diet.  Voiding spontaneously. Working well with therapy, anticipated discharge home.    O:  Temp: 98.7  F (37.1  C) Temp src: Oral BP: 121/74 Pulse: 88   Resp: 16 SpO2: 97 % O2 Device: Nasal cannula Oxygen Delivery: 2 LPM    Exam:  Gen: No acute distress, resting comfortably in bed.  Resp: Non-labored breathing  MSK: Dressing CDI    Motor Strength Right Left   Hip flexion: L1, L2, L3 5/5 5/5   Hip adduction: L2, L3 5/5 5/5   Knee flexion: S1 5/5 5/5   Knee extension: L3, L4 5/5 5/5   Ankle dosiflexion: L4, L5 5/5 5/5   EHL: L5 5/5 5/5   Ankle plantarflexion: S1 5/5 5/5     Sensation from L1-S2 is preserved.    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 Allan     11/10/22   PT/OT  Pain control  Disposition planning, home today vs tomorrow      Ortho  Activity: Up with assist; No excessive bending or twisting; no lifting > 10 pounds x 6 weeks  Weight bearing status: WBAT  Antibiotics: Ancef x 24h  Diet: Begin with clear fluids and progress diet as tolerated; Bowel regimen  DVT prophylaxis: mechanical only, no chemical DVT PPx needed on d/c  Bracing/Splinting: no brace needed  Wound Care: Aquacel dressing to remain in place x 7-10 days  Drains: Removed 11/9  Pain management: transition from IV to orals as tolerated.  Olson: Removed 11/9  X-rays: Upright thoracic XR;  thoracic CT prior to discharge - ordered  Physical Therapy: gait training, mobilization ADLs.  Labs: Trend Hgb on POD #1, 2, 3 .   Consults: PT/OT, Hospitalist appreciate assistance in caring for this patient.  Follow-up: Clinic with Dr. Allan @ 6 weeks post op     Disposition: Pending progress with therapies, pain control on orals, and medical stability, anticipate discharge to Home vs Rehab on POD #3-4.     Moe Juan MD PGY4  Orthopaedic Surgery

## 2022-11-10 NOTE — PLAN OF CARE
Daily PRN supp given   Pt up to BR   Small BM  Still feeling full/having discomfort   A1 - walking in hallway

## 2022-11-10 NOTE — DISCHARGE SUMMARY
ORTHOPAEDIC SURGERY DISCHARGE SUMMARY     Date of Admission: 11/7/2022  Date of Discharge: 11/11/2022 10:50 AM  Disposition: Home  Staff Physician: Evaristo Allan*  Primary Care Provider: Ryan, Rangel Charles      ADMISSION DIAGNOSIS:  Intervertebral disc disorder of thoracic region with myelopathy [M51.04]    DISCHARGE DIAGNOSIS:  Intervertebral disc disorder of thoracic region with myelopathy [M51.04]    PROCEDURES: Procedure(s):  Extended Pedicle Subtraction Osteotomy Thoracic 7 with Expandable Cage Reconstruction, Posterior Spinal Fusion Thoracic 5 to Thoracic 9 on 11/7/2022    BRIEF HISTORY:   57 year old female with progressive thoracic myelopathy (gait imbalance).  Imaging revealed large thoracic disc herniation T6-7, with spinal cord compression/deformation.  Tried nonoperative treatment, continues to have significant disabling symptoms.  I thus offered surgery in form of thoracic decompression-diskectomy T6-7 with fusion.  Consented after thorough discussion of the rationale, risks, benefits and alternatives.  Discussed bone graft options; consented to use of local autograft and allograft.     HOSPITAL COURSE:    The patient was admitted following the above listed procedures for pain control and rehabilitation. Emeli Beyer did well post-operatively. Medicine was consulted post operatively to aid in management of medical co-morbidities. The patient received routine nursing cares and at the time of discharge was medically stable. Vital signs were stable throughout admission. The patient is tolerating a regular diet and is voiding spontaneously. All PT/OT goals have been met for safe mobility. Pain is now controlled on oral medications which will be available on discharge. Stool softeners have been used while taking pain medications to help prevent constipation. Emeli Beyer is deemed medically safe to discharge on POD4.     Antibiotics:  Ancef given periop and 24 hours postop.   DVT prophylaxis:   SCDs initiated after surgery   PT Progress:  Has met PT/OT goals for safe mobility. PT recommending home   Pain Meds:  Weaned off all IV pain meds by discharge.  Inpatient Events: No significant events or complications. Postoperative ileus, resolved by time of discharge.    PHYSICAL EXAM:    Please refer to today's progress note for physical exam.     FOLLOWUP:    Future Appointments   Date Time Provider Department Center   11/10/2022  1:00 PM Ria Julien OT [facility] [place]   12/15/2022 11:40 AM Evaristo Allan MD [facility]       Orthopaedic Surgery appointments are at the [facility] [place] ([address] [place] [address]). Call [phone] to schedule a follow-up appointment at this location with your provider.     PLANNED DISCHARGE ORDERS:     DVT Prophylaxis: mechanical only, no chemical DVT PPx needed on d/c     Activity: Up with assist; No excessive bending or twisting; no lifting > 10 pounds x 6 weeks     Wound Care: see Below      Discharge Medication List as of 11/11/2022 10:17 AM      START taking these medications    Details   calcium carbonate-vitamin D (OSCAL) 500-5 MG-MCG tablet Take 1 tablet by mouth daily, Disp-30 tablet, R-0, E-Prescribe         CONTINUE these medications which have CHANGED    Details   acetaminophen (TYLENOL) 325 MG tablet Take 2 tablets (650 mg) by mouth every 4 hours as needed for mild pain or fever, Disp-60 tablet, R-0, E-Prescribe      docusate sodium (COLACE) 100 MG capsule Take 1-2 capsules (100-200 mg) by mouth 2 times daily, Disp-30 capsule, R-0, E-Prescribe      HYDROmorphone (DILAUDID) 2 MG tablet Take 1-2 tablets (2-4 mg) by mouth every 3 hours as needed for moderate to severe pain, Disp-36 tablet, R-0, E-Prescribe      hydrOXYzine (ATARAX) 25 MG tablet Take 1 tablet (25 mg) by mouth every 6 hours as needed for other (adjuvant pain), Disp-40 tablet, R-0, E-Prescribe      methocarbamol (ROBAXIN) 750 MG tablet Take 1 tablet (750  mg) by mouth every 6 hours as needed for muscle spasms, Disp-30 tablet, R-0, E-Prescribe      !! polyethylene glycol (MIRALAX) 17 g packet Take 17 g by mouth daily, Disp-10 packet, R-0, E-Prescribe      !! polyethylene glycol (MIRALAX) 17 GM/Dose powder Take 17 g by mouth 2 times daily, Disp-510 g, R-0, E-Prescribe      senna-docusate (SENOKOT-S/PERICOLACE) 8.6-50 MG tablet Take 1 tablet by mouth 2 times daily, Disp-30 tablet, R-0, E-Prescribe       !! - Potential duplicate medications found. Please discuss with provider.      CONTINUE these medications which have NOT CHANGED    Details   ADVAIR -21 MCG/ACT inhaler INHALE 2 PUFFS BY MOUTH TWICE DAILY, CLARENCE, Historical      FLUoxetine (PROZAC) 10 MG capsule Take 10 mg by mouth At Bedtime, Historical      nicotine (NICODERM CQ) 21 MG/24HR 24 hr patch Place 1 patch onto the skin every 24 hours, Historical         STOP taking these medications       lisinopril-hydrochlorothiazide (ZESTORETIC) 20-12.5 MG tablet Comments:   Reason for Stopping:                Discharge Procedure Orders   Physical Therapy Referral   Standing Status: Future   Referral Priority: Routine Referral Type: Rehab Therapy Physical Therapy   Number of Visits Requested: 1     Reason for your hospital stay   Order Comments: Emeli Beyer is a 57 year old female s/p Procedure(s):  Extended Pedicle Subtraction Osteotomy Thoracic 7 with Expandable Cage Reconstruction, Posterior Spinal Fusion Thoracic 5 to Thoracic 9, performed secondary to 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 Allan     Activity   Order Comments: Your activity upon discharge: activity as tolerated    Activity: Up with assist; No excessive bending or twisting; no lifting > 10 pounds x 6 weeks  Weight bearing status: WBAT     Order Specific Question Answer Comments   Is discharge order? Yes      When to contact your care team   Order Comments: Call Dr Allan if you have  any of the following: temperature greater than 101.3  or less than 96.5,  increased shortness of breath, increased drainage, increased swelling, or increased pain.     Wound care and dressings   Order Comments: Instructions to care for your wound at home: ice to area for comfort, keep wound clean and dry, may get incision wet in shower but do not soak or scrub, reinforce dressing as needed, and remove dressing in 7 days.     Adult [facility] Follow-up and recommended labs and tests   Order Comments: Follow up with Dr Allan as scheduled.  Clinic phone number is     Check BMP, Magnesium, Phosphrous with primary care on Monday  Follow up with Primary care in 7 days  Follow up with GI for colonoscopy in 2-3 weeks    Appointments on [place] and/or [facility] (with [facility] or [facility] provider or service). Call [phone] if you haven't heard regarding these appointments within 7 days of discharge.     Diet   Order Comments: Follow this diet upon discharge: Orders Placed This Encounter      NPO for Medical/Clinical Reasons Except for: Meds, Ice Chips     Order Specific Question Answer Comments   Is discharge order? Yes        Moe Juan MD  Orthopaedic Surgery PGY-4

## 2022-11-11 VITALS
RESPIRATION RATE: 16 BRPM | HEIGHT: 66 IN | BODY MASS INDEX: 23.49 KG/M2 | SYSTOLIC BLOOD PRESSURE: 142 MMHG | HEART RATE: 87 BPM | WEIGHT: 146.16 LBS | OXYGEN SATURATION: 91 % | TEMPERATURE: 99.4 F | DIASTOLIC BLOOD PRESSURE: 80 MMHG

## 2022-11-11 LAB
ANION GAP SERPL CALCULATED.3IONS-SCNC: 6 MMOL/L (ref 3–14)
BASOPHILS # BLD AUTO: 0 10E3/UL (ref 0–0.2)
BASOPHILS NFR BLD AUTO: 0 %
BUN SERPL-MCNC: 5 MG/DL (ref 7–30)
CALCIUM SERPL-MCNC: 8.1 MG/DL (ref 8.5–10.1)
CHLORIDE BLD-SCNC: 98 MMOL/L (ref 94–109)
CO2 SERPL-SCNC: 28 MMOL/L (ref 20–32)
CREAT SERPL-MCNC: 0.43 MG/DL (ref 0.52–1.04)
EOSINOPHIL # BLD AUTO: 0.2 10E3/UL (ref 0–0.7)
EOSINOPHIL NFR BLD AUTO: 1 %
ERYTHROCYTE [DISTWIDTH] IN BLOOD BY AUTOMATED COUNT: 14.3 % (ref 10–15)
GFR SERPL CREATININE-BSD FRML MDRD: >90 ML/MIN/1.73M2
GLUCOSE BLD-MCNC: 90 MG/DL (ref 70–99)
HCT VFR BLD AUTO: 29 % (ref 35–47)
HGB BLD-MCNC: 9.6 G/DL (ref 11.7–15.7)
IMM GRANULOCYTES # BLD: 0 10E3/UL
IMM GRANULOCYTES NFR BLD: 0 %
LYMPHOCYTES # BLD AUTO: 1.4 10E3/UL (ref 0.8–5.3)
LYMPHOCYTES NFR BLD AUTO: 12 %
MAGNESIUM SERPL-MCNC: 2.3 MG/DL (ref 1.6–2.3)
MCH RBC QN AUTO: 32 PG (ref 26.5–33)
MCHC RBC AUTO-ENTMCNC: 33.1 G/DL (ref 31.5–36.5)
MCV RBC AUTO: 97 FL (ref 78–100)
MONOCYTES # BLD AUTO: 1.5 10E3/UL (ref 0–1.3)
MONOCYTES NFR BLD AUTO: 12 %
NEUTROPHILS # BLD AUTO: 8.8 10E3/UL (ref 1.6–8.3)
NEUTROPHILS NFR BLD AUTO: 75 %
NRBC # BLD AUTO: 0 10E3/UL
NRBC BLD AUTO-RTO: 0 /100
PHOSPHATE SERPL-MCNC: 2.3 MG/DL (ref 2.5–4.5)
PLATELET # BLD AUTO: 301 10E3/UL (ref 150–450)
POTASSIUM BLD-SCNC: 3.6 MMOL/L (ref 3.4–5.3)
RBC # BLD AUTO: 3 10E6/UL (ref 3.8–5.2)
SODIUM SERPL-SCNC: 132 MMOL/L (ref 133–144)
WBC # BLD AUTO: 11.9 10E3/UL (ref 4–11)

## 2022-11-11 PROCEDURE — 83735 ASSAY OF MAGNESIUM: CPT | Performed by: INTERNAL MEDICINE

## 2022-11-11 PROCEDURE — 250N000013 HC RX MED GY IP 250 OP 250 PS 637: Performed by: STUDENT IN AN ORGANIZED HEALTH CARE EDUCATION/TRAINING PROGRAM

## 2022-11-11 PROCEDURE — 80048 BASIC METABOLIC PNL TOTAL CA: CPT | Performed by: INTERNAL MEDICINE

## 2022-11-11 PROCEDURE — 250N000013 HC RX MED GY IP 250 OP 250 PS 637: Performed by: INTERNAL MEDICINE

## 2022-11-11 PROCEDURE — 84100 ASSAY OF PHOSPHORUS: CPT | Performed by: INTERNAL MEDICINE

## 2022-11-11 PROCEDURE — 36415 COLL VENOUS BLD VENIPUNCTURE: CPT | Performed by: INTERNAL MEDICINE

## 2022-11-11 PROCEDURE — 85025 COMPLETE CBC W/AUTO DIFF WBC: CPT | Performed by: INTERNAL MEDICINE

## 2022-11-11 PROCEDURE — 250N000013 HC RX MED GY IP 250 OP 250 PS 637

## 2022-11-11 PROCEDURE — 99232 SBSQ HOSP IP/OBS MODERATE 35: CPT | Performed by: INTERNAL MEDICINE

## 2022-11-11 RX ORDER — POLYETHYLENE GLYCOL 3350 17 G/17G
17 POWDER, FOR SOLUTION ORAL 2 TIMES DAILY
Qty: 510 G | Refills: 0 | Status: SHIPPED | OUTPATIENT
Start: 2022-11-11

## 2022-11-11 RX ADMIN — HYDROMORPHONE HYDROCHLORIDE 2 MG: 2 TABLET ORAL at 02:31

## 2022-11-11 RX ADMIN — FLUTICASONE FUROATE AND VILANTEROL TRIFENATATE 1 PUFF: 100; 25 POWDER RESPIRATORY (INHALATION) at 07:48

## 2022-11-11 RX ADMIN — HYDROXYZINE HYDROCHLORIDE 25 MG: 25 TABLET, FILM COATED ORAL at 05:18

## 2022-11-11 RX ADMIN — HYDROXYZINE HYDROCHLORIDE 25 MG: 25 TABLET, FILM COATED ORAL at 10:29

## 2022-11-11 RX ADMIN — HYDROMORPHONE HYDROCHLORIDE 4 MG: 2 TABLET ORAL at 07:43

## 2022-11-11 RX ADMIN — ACETAMINOPHEN 975 MG: 325 TABLET, FILM COATED ORAL at 07:48

## 2022-11-11 RX ADMIN — POLYETHYLENE GLYCOL 3350 17 G: 17 POWDER, FOR SOLUTION ORAL at 07:43

## 2022-11-11 RX ADMIN — METHOCARBAMOL 750 MG: 750 TABLET ORAL at 02:31

## 2022-11-11 RX ADMIN — BISACODYL 10 MG: 10 SUPPOSITORY RECTAL at 05:18

## 2022-11-11 RX ADMIN — POTASSIUM & SODIUM PHOSPHATES POWDER PACK 280-160-250 MG 1 PACKET: 280-160-250 PACK at 07:43

## 2022-11-11 RX ADMIN — HYDROMORPHONE HYDROCHLORIDE 4 MG: 2 TABLET ORAL at 09:47

## 2022-11-11 RX ADMIN — SENNOSIDES AND DOCUSATE SODIUM 1 TABLET: 50; 8.6 TABLET ORAL at 07:43

## 2022-11-11 ASSESSMENT — ACTIVITIES OF DAILY LIVING (ADL)
ADLS_ACUITY_SCORE: 19
ADLS_ACUITY_SCORE: 20
ADLS_ACUITY_SCORE: 20
ADLS_ACUITY_SCORE: 19
ADLS_ACUITY_SCORE: 20

## 2022-11-11 NOTE — PROGRESS NOTES
Orthopaedic Surgery Progress Note 11/11/2022    S: NAEON. Pain well controlled. BM evening/overnight, denies N/V this AM. Some abdominal cramping. Denies numbness or tingling. Denies chest pain, SOB, nausea/vomiting. Tolerating regular diet.  Voiding spontaneously. Working well with therapy, anticipated discharge home.    O:  Temp: 99.4  F (37.4  C) Temp src: Oral BP: 124/65 Pulse: 77   Resp: 16 SpO2: 95 % O2 Device: None (Room air) Oxygen Delivery: 1 LPM    Exam:  Gen: No acute distress, resting comfortably in bed.  Resp: Non-labored breathing  MSK: Dressing CDI    Motor Strength Right Left   Hip flexion: L1, L2, L3 5/5 5/5   Hip adduction: L2, L3 5/5 5/5   Knee flexion: S1 5/5 5/5   Knee extension: L3, L4 5/5 5/5   Ankle dosiflexion: L4, L5 5/5 5/5   EHL: L5 5/5 5/5   Ankle plantarflexion: S1 5/5 5/5     Sensation from L1-S2 is preserved.    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 Allan.Postoperative ileus.      11/11/22   PT/OT  Pain control  Home today vs tomorrow pending GI progression     Ortho  Activity: Up with assist; No excessive bending or twisting; no lifting > 10 pounds x 6 weeks  Weight bearing status: WBAT  Antibiotics: Ancef x 24h- complete  Diet: Begin with clear fluids and progress diet as tolerated; Bowel regimen  DVT prophylaxis: mechanical only, no chemical DVT PPx needed on d/c  Bracing/Splinting: no brace needed  Wound Care: Aquacel dressing to remain in place x 7-10 days  Drains: Removed 11/9  Pain management: transition from IV to orals as tolerated.  Olson: Removed 11/9  X-rays:  Upright thoracic XR; thoracic CT prior to discharge- complete  Physical Therapy: gait training, mobilization ADLs.  Labs: Trend Hgb on POD #1, 2, 3 .   Consults: PT/OT, Hospitalist appreciate assistance in caring for this patient.  Follow-up: Clinic with Dr. Allan @ 6 weeks post op     Disposition: Pending progress with therapies, pain control on orals, and medical stability, anticipate discharge to Home vs Rehab on POD #3-4.     Moe Juan MD PGY4  Orthopaedic Surgery

## 2022-11-11 NOTE — PROGRESS NOTES
"[facility], [place]   Internal Medicine Daily Note           Interval History/Events     Overnight events reviewed  Reports doing well  Had good bowel movement. No pain in the abdomen  Ate pasta and no vomiting  Feeling at usual state of health       Review of Systems        4 point ROS including Respiratory, CV, GI and , other than that noted above is negative      Medications   I have reviewed current medications  in the \"current medication\" section of Epic.  Relevant changes include:     Physical Exam   General:       Vital signs:    Blood pressure (!) 142/80, pulse 87, temperature 99.4  F (37.4  C), temperature source Oral, resp. rate 16, height 1.664 m (5' 5.51\"), weight 66.3 kg (146 lb 2.6 oz), SpO2 91 %, not currently breastfeeding.  Estimated body mass index is 23.94 kg/m  as calculated from the following:    Height as of this encounter: 1.664 m (5' 5.51\").    Weight as of this encounter: 66.3 kg (146 lb 2.6 oz).      Intake/Output Summary (Last 24 hours) at 11/8/2022 1417  Last data filed at 11/8/2022 0639  Gross per 24 hour   Intake 740 ml   Output 1350 ml   Net -610 ml        Constitutional: Laying in bed in no acute distress  Eye: No icterus, no pallor  Mouth/ENT: Normal oral mucosa  Cardiovascular: S1, S2 normal  Respiratory: B/L CTA  GI: Soft, mildly distended, Mildly tender through out  :   Neurology: Alert, awake, and oriented  Psych:   MSK:   Integumentary:   Heme/Lymph/Imm:      Laboratory and Imaging Studies     I have reviewed  laboratory and imaging studies in the Epic. Pertinent findings are as below:    BMP  Recent Labs   Lab 11/11/22  0644 11/10/22  1019 11/09/22  0602 11/08/22  0632 11/08/22  0546 11/07/22  1151   * 138  --   --  139 136   POTASSIUM 3.6 3.4  --   --  4.0 4.3   CHLORIDE 98 101  --   --  102  --    KASIA 8.1* 7.6*  --   --  7.2*  --    CO2 28 26  --   --  28  --    BUN 5* 4*  --   --  7  --    CR 0.43* 0.56  --   --  0.60  --    GLC 90 89 " 99 86 89 136*     CBC  Recent Labs   Lab 11/11/22  0644 11/08/22  2126 11/08/22  0546 11/07/22  1429   WBC 11.9*  --  9.3 12.0*   RBC 3.00*  --  1.88* 2.58*   HGB 9.6*   < > 6.3* 8.6*   HCT 29.0*  --  19.5* 25.9*   MCV 97  --  104* 100   MCH 32.0  --  33.5* 33.3*   MCHC 33.1  --  32.3 33.2   RDW 14.3  --  12.6 12.5     --  187 280    < > = values in this interval not displayed.     INRNo lab results found in last 7 days.  LFTsNo lab results found in last 7 days.   PANCNo lab results found in last 7 days.        Impression/Plan         Evaluation, Recommendations and Co-management of Medical Comorbidities.         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  By Dr Allan.   EBL: 900 CC  Complications: None.            PMH, Pre Op eval reviewed.  Comorbid conditions of hypertension, asthma, tobacco use.           # s/p spinal surgery on 11/08/2022:  Management primarily per Ortho team.  - Wound cares, Dressings, Surgical pain management, DVT Prophylaxis  per primary team.   - Monitor anemia, hemodynamics, Input/Output, Capnography (for 24 hours)  - Encourage Incentive spirometry  - Laxatives for constipation prophylaxis     # Anemia: Most likely due to  blood loss, and post surgical inflammation.   Pre op Hgb 12.5 .   Hg 6.3 gm/dl on 11/08. S/p  2 units PRBC Hg 9.4 gm/dl on 11/08  - Monitor       # Post op ileus:  No BM yet, passing gas intermittently.   Had 2 BM . One on 11/09, and small one on 11/10. X ray abdomen was done which showed finding suggestive of ileus vs SBO. . Dilated large bowel measuring up to 9 cm  including the ascending, transverse and proximal descending colon GI consulted for further evaluation.    Patient is tolerating regular diet, having large BM, and has no abdominal pain.   - Advised to minimize narcotics, and take alternative for pain  - Take Senna BID, and PEG BID  - Suppository PRN  - Follow up with GI in 3-4 weeks for colonscopy    # Electrolyte disorder  # Low sodium: Likely d/t poor po intake, advised to drink to thirst. Repeat level on Monday with primary care  # Low phosphorus: Treated with  Neutraphos TID. Follow up with primary care on Monday      # Mild leukocytosis: No obvious GI, , Respiratory signs of infection.   - Monitor      CVS:   No hx of CAD. Afib.   HTN: hold pta med 2.2 soft bp. Monitor. Resume as able.      Pulm:   Tobacco use:   Asthma: stable. Ct home inhalers.   -using advair daily and albuterol about once/week.   -She continues to smoke 1PPD. Encouraged decreased smoking, nicotine patch     GERD:   PONV:   currently with heartburn  - Continue  famotidine IV 20 mg bid   - tums prn  - antiemetics prn     Psychiatry: mood stable.   - Continue pta fluoxetine.         Pt's care was discussed with bedside RN, patient and  during Care Team Rounds.               Juan Manuel Ruano MD  Hospitalist ( Internal medicine)  Pager: [phone]

## 2022-11-11 NOTE — PLAN OF CARE
Goal Outcome Evaluation:      Plan of Care Reviewed With: patient    Overall Patient Progress: improvingOverall Patient Progress: improving    Outcome Evaluation: VSS. Pt was ambulating independently. Pt had two bowel movements. Appears to be sleepind during rounds. Pain managed with Oral dilaudid and robaxin.

## 2022-11-11 NOTE — PLAN OF CARE
Patient A&Ox4.   On RA  SBA w/ walker and gait belt.  Regular diet.   LPIV SL.   Dressing CDI.   C/o 8/10 pain. Managed with scheduled and PRN medications.     Able to make needs known. Continue with POC.

## 2022-11-11 NOTE — PLAN OF CARE
Alert and O x4,   Independent walking with walker in the room and the hallway without dizziness or weakness. C/o pain at back incision site, about 9-10 / 10, PRN oral dilaudid offered with mild relief, cold pack offered also. BP elevated when she c/o pian.     Incision site dressing CDI.   Denied N/T, SOB, chest pain, or nausea.     Cont of B/B, had BM this AM.     Plan to discharge home this shift.

## 2022-11-14 ENCOUNTER — TELEPHONE (OUTPATIENT)
Dept: ORTHOPEDICS | Facility: CLINIC | Age: 57
End: 2022-11-14

## 2022-11-14 DIAGNOSIS — M48.04 THORACIC SPINAL STENOSIS: ICD-10-CM

## 2022-11-14 RX ORDER — HYDROMORPHONE HYDROCHLORIDE 2 MG/1
2-4 TABLET ORAL EVERY 4 HOURS PRN
Qty: 84 TABLET | Refills: 0 | Status: SHIPPED | OUTPATIENT
Start: 2022-11-14 | End: 2022-11-21

## 2022-11-14 NOTE — TELEPHONE ENCOUNTER
[facility] Call Center    Phone Message    May a detailed message be left on voicemail: yes     Reason for Call: Medication Refill Request    Has the patient contacted the pharmacy for the refill? Yes   Name of medication being requested:Dilaudid  Provider who prescribed the medication:   Pharmacy: [facility]  Date medication is needed: ASAp   Yes      Action Taken: Other: [facility]    Travel Screening: Not Applicable

## 2022-11-14 NOTE — TELEPHONE ENCOUNTER
[facility] Call Center    Phone Message    May a detailed message be left on voicemail: yes     Reason for Call: Other: pt calling in asking about pain med - dilaudid.  Advised med has been sent in and looks like its pending prior auth.  Pt asking if there is something else that can be called in until that med comes through.  Pt would like call back at [phone]     Action Taken: Message routed to:  [facility] UMP:  Dr. Allan     Travel Screening: Not Applicable

## 2022-11-14 NOTE — TELEPHONE ENCOUNTER
[facility] Call Center    Phone Message    May a detailed message be left on voicemail: yes     Reason for Call: Other: patient is calling back about this refill and is hoping to get a call back today about      Action Taken: Message routed to:  [facility] pau    Travel Screening: Not Applicable

## 2022-11-14 NOTE — TELEPHONE ENCOUNTER
Sent refill of 2mg Dilaudid. Patient notes taking 2 tabs every 3 hours. Transitioned to 2 tabs every 4 hours. Max of 12 tabs per day for 7 days. Next refill max of 10-11 tabs per day of 2mg Dilaudid.    Bishop EDINSON Frank PA-C

## 2022-11-15 ENCOUNTER — TELEPHONE (OUTPATIENT)
Dept: ORTHOPEDICS | Facility: CLINIC | Age: 57
End: 2022-11-15

## 2022-11-15 DIAGNOSIS — M48.04 THORACIC SPINAL STENOSIS: Primary | ICD-10-CM

## 2022-11-15 RX ORDER — OXYCODONE HYDROCHLORIDE 5 MG/1
5-10 TABLET ORAL EVERY 4 HOURS PRN
Qty: 20 TABLET | Refills: 0 | Status: SHIPPED | OUTPATIENT
Start: 2022-11-15 | End: 2022-11-22

## 2022-11-15 NOTE — TELEPHONE ENCOUNTER
Central Prior Authorization Team   Phone: [phone]      PA Initiation    Medication: Med refill - out of meds - and waiting on prior auth for dilaudid   Insurance Company: [employer] - Phone [phone] Fax [phone]  Pharmacy Filling the Rx: [facility] - [place], [place] - [address]  Filling Pharmacy Phone:    Filling Pharmacy Fax:    Start Date: 11/15/2022

## 2022-11-15 NOTE — TELEPHONE ENCOUNTER
Can someone please look into the prior auth? One option is to prescribe oxycodone if it is not approved

## 2022-11-15 NOTE — TELEPHONE ENCOUNTER
[facility] Call Center    Phone Message    May a detailed message be left on voicemail: yes     Reason for Call: Other: Pt is in severe pain . Requested refill yesterday still waiting on prior auth. She is completely out of meds is wondering if there is something else she can take that doesn't require PA      Action Taken: Other: ortho [facility]    Travel Screening: Not Applicable

## 2022-11-15 NOTE — TELEPHONE ENCOUNTER
Called patient, she is working with pharmacy and PCPs office, has been out of pain meds 24 hr, had extensive spinal fusion so I would expect she is in a lot of pain without opioids. She is taking hydroxyzine, APAP, methocarbamol. Explained urgent PA has been placed. Will send for Rx of oxycodone to be filled while awaiting dilaudid. She can also request to self pay at the pharmacy. Informed her that even if her PCP gives an Rx, she will have the same issue so will plan to have only our office prescribe narcotics otherwise it becomes problematic.

## 2022-11-16 NOTE — TELEPHONE ENCOUNTER
Prior Authorization Approval        Authorization Effective Date: 8/17/2022  Authorization Expiration Date: 12/15/2022  Medication: HYDROmorphone (DILAUDID) 2 MG tablet  Approved Dose/Quantity: 84 per 12 days  Reference #:     Insurance Company: [employer] - Phone [phone] Fax [phone]  Which Pharmacy is filling the prescription (Not needed for infusion/clinic administered): [facility] - [place], [place] - [address]  Pharmacy Notified: Yes - spoke to pharmacy, was told they didn't need a P/A, they need a renewal prescription. I do see the order her in medication list but for some reason pharmacy doesn't have it. Please send new rx to pharmacy. They'll fill right away once received.  Patient Notified: No

## 2022-11-17 ENCOUNTER — DOCUMENTATION ONLY (OUTPATIENT)
Dept: ORTHOPEDICS | Facility: CLINIC | Age: 57
End: 2022-11-17
Payer: COMMERCIAL

## 2022-11-17 DIAGNOSIS — R26.89 IMPAIRED GAIT AND MOBILITY: Primary | ICD-10-CM

## 2022-11-17 DIAGNOSIS — Z53.9 ERRONEOUS ENCOUNTER--DISREGARD: ICD-10-CM

## 2022-11-17 NOTE — TELEPHONE ENCOUNTER
I got message from our PA dept on 11-16-22 that they got approval from Insurance for Dilaudid & they called pharmacy who stated they did not get the refill of Dilaudid previously placed this week.    I called pharmacy who stated they did get the refil of Dilaudid sent to them this AM TH 11-17-22-see epic med list, & it is approved by Insurance.    I tried to call pt & left message that is approved & F/U with pharmacy.   Call back prn.  Bobbi Oliveira RN.

## 2022-11-23 ENCOUNTER — TELEPHONE (OUTPATIENT)
Dept: ORTHOPEDICS | Facility: CLINIC | Age: 57
End: 2022-11-23

## 2022-11-23 NOTE — TELEPHONE ENCOUNTER
[facility] Call Center    Phone Message    May a detailed message be left on voicemail: yes     Reason for Call: Other: [facility] received a referrall order for patient. They feel the order protocols may not have been correct and would like to double check on correct protocol for the order. Please call and advise.     Action Taken: Other: [facility]    Travel Screening: Not Applicable

## 2022-11-29 ENCOUNTER — DOCUMENTATION ONLY (OUTPATIENT)
Facility: CLINIC | Age: 57
End: 2022-11-29

## 2022-11-29 DIAGNOSIS — R26.89 IMPAIRED GAIT AND MOBILITY: Primary | ICD-10-CM

## 2022-11-29 DIAGNOSIS — M48.04 THORACIC SPINAL STENOSIS: ICD-10-CM

## 2022-11-29 RX ORDER — METHOCARBAMOL 750 MG/1
750 TABLET, FILM COATED ORAL EVERY 6 HOURS PRN
Qty: 30 TABLET | Refills: 2 | Status: SHIPPED | OUTPATIENT
Start: 2022-11-29 | End: 2022-12-12

## 2022-11-29 RX ORDER — HYDROXYZINE HYDROCHLORIDE 25 MG/1
25 TABLET, FILM COATED ORAL EVERY 6 HOURS PRN
Qty: 40 TABLET | Refills: 2 | Status: SHIPPED | OUTPATIENT
Start: 2022-11-29

## 2022-11-29 RX ORDER — ACETAMINOPHEN 325 MG/1
650 TABLET ORAL EVERY 4 HOURS PRN
Qty: 100 TABLET | Refills: 1 | Status: SHIPPED | OUTPATIENT
Start: 2022-11-29

## 2022-11-29 RX ORDER — HYDROMORPHONE HYDROCHLORIDE 2 MG/1
2 TABLET ORAL EVERY 4 HOURS PRN
Qty: 42 TABLET | Refills: 0 | Status: SHIPPED | OUTPATIENT
Start: 2022-11-29 | End: 2022-12-06

## 2022-11-29 NOTE — TELEPHONE ENCOUNTER
See phone message from pt postop.    I called her back.  She ran out of Dilaudid today & stated she called her pharmacy Wed before Holiday for refill & did not call us to check yesterday Mon.    Rates pain 9 & has decreased Dilaudid from ordered Ma 12 per day to 6 per day & plans to continue to try to decrease.    Taking Tylenol, Robaxin & Atarax as ordered.  Having BMs & states has plenty of OTC stool softeners.    Refilled Dilaudid, Tylenol, Robaxin & Atarax.  Call back prn.  Pt agreed.  S.O./Bobbi Oliveira RN.

## 2022-11-29 NOTE — TELEPHONE ENCOUNTER
[facility] Call Center    Phone Message    May a detailed message be left on voicemail: yes     Reason for Call: Other: Emeli called she is out of dilaudid and in pain she took her last dose today at 8am and needs a refill. Please call patient when done or if have questions     Action Taken: Other: [facility]    Travel Screening: Not Applicable

## 2022-11-29 NOTE — TELEPHONE ENCOUNTER
Max of 6 tabs per day of Dilaudid provided. Reviewed PDMP. Next refill max of 4-5 tabs per day.    Bishop EDINSON Frank PA-C

## 2022-12-09 DIAGNOSIS — M48.04 THORACIC SPINAL STENOSIS: Primary | ICD-10-CM

## 2022-12-12 ENCOUNTER — TELEPHONE (OUTPATIENT)
Dept: ORTHOPEDICS | Facility: CLINIC | Age: 57
End: 2022-12-12

## 2022-12-12 DIAGNOSIS — M48.04 THORACIC SPINAL STENOSIS: ICD-10-CM

## 2022-12-12 RX ORDER — METHOCARBAMOL 750 MG/1
750 TABLET, FILM COATED ORAL EVERY 6 HOURS PRN
Qty: 30 TABLET | Refills: 2 | Status: SHIPPED | OUTPATIENT
Start: 2022-12-12 | End: 2023-03-14

## 2022-12-12 RX ORDER — HYDROCODONE BITARTRATE AND ACETAMINOPHEN 5; 325 MG/1; MG/1
1 TABLET ORAL EVERY 6 HOURS PRN
Qty: 28 TABLET | Refills: 0 | Status: SHIPPED | OUTPATIENT
Start: 2022-12-12 | End: 2022-12-16

## 2022-12-12 NOTE — TELEPHONE ENCOUNTER
Called and spoke to patient's.  She notes that she is only utilizing 3 tablets of Dilaudid per day.  Would like to switch to a different medication for pain control.  Notes that things are progressing as expected.  No new concerns.    Bishop EDINSON Frank PA-C

## 2022-12-12 NOTE — TELEPHONE ENCOUNTER
[facility] Call [facility]    Phone Message    May a detailed message be left on voicemail: yes     Reason for Call: Medication Refill Request    Has the patient contacted the pharmacy for the refill? Yes   Name of medication being requested: Dilaudid   Provider who prescribed the medication: Jerrell  Pharmacy: [facility] - [place], [place]     Date medication is needed: TODAY      Action Taken: Message routed to:  [facility] ORTHO    Travel Screening: Not Applicable     Please call back once processed

## 2022-12-15 ENCOUNTER — ANCILLARY PROCEDURE (OUTPATIENT)
Dept: GENERAL RADIOLOGY | Facility: CLINIC | Age: 57
End: 2022-12-15
Attending: ORTHOPAEDIC SURGERY
Payer: COMMERCIAL

## 2022-12-15 ENCOUNTER — OFFICE VISIT (OUTPATIENT)
Dept: ORTHOPEDICS | Facility: CLINIC | Age: 57
End: 2022-12-15
Payer: COMMERCIAL

## 2022-12-15 DIAGNOSIS — Z98.1 S/P FUSION OF THORACIC SPINE: Primary | ICD-10-CM

## 2022-12-15 DIAGNOSIS — M48.04 THORACIC SPINAL STENOSIS: ICD-10-CM

## 2022-12-15 PROCEDURE — 72082 X-RAY EXAM ENTIRE SPI 2/3 VW: CPT | Performed by: SURGERY

## 2022-12-15 PROCEDURE — 99024 POSTOP FOLLOW-UP VISIT: CPT | Performed by: ORTHOPAEDIC SURGERY

## 2022-12-15 NOTE — PROGRESS NOTES
Spine Surgical Hx:  ~2004 - ACDF with plate fixation C5-C7 (?).  Complicated by postop hoarseness.  10/16/2014 - ACDF with plate fixation C3-C5 (2 levels); use of structural corticocancellous allograft; Removal of anterior plate C5-C7 (CARLOS Medrano) for CSM.  [Implants: Synthes Vectra plate].  11/03/2017 - ACDF with platle fixation C7-T1; use of Cornerstone corticocancellous allograft 14 x 14mm and [place] DBM.  (CARLOS Medrano).  [Implants: Medtronic Zevo plate].  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  [Implants: Medtronic Solera system, 5.5 mm CoCr rods x 2; Stratosphere expandable cage].    In-Person Visit    Chief Complaint   Patient presents with     Surgical Followup     DOS 11/7/22 S/P Extended Pedicle Subtraction Osteotomy Thoracic 7 with Expandable Cage Reconstruction, Posterior Spinal Fusion Thoracic 5 to Thoracic 9       S>  57 year old female, 6 wks postop; 1st postop visit.    Unaccompanied.  Happy with surgery.  Says her preoperative balance issues are now much better.  She could now close her eyes while standing and not fall.  Walking, standing are also much better.  She is very happy that she is a lot more steady.  She tried to start outpatient physical therapy locally, but was told that she would have to see me first.    Her biggest complaint is postsurgical pain.  She said she continues to have pain wrapping around her rib cage bilaterally.  This was noted postoperatively.  Fortunately, seems to be gradually getting better over time.  Also says that the distal end of her skin incision is not yet fully healed.    Gradually working on weaning off the pain medicines.  Currently taking Norco 5/325 mg/tab, take max 3 tabs/day; helping.  Of note, preoperatively not taking opioids.    Oswestry (MARCO) Questionnaire    OSWESTRY DISABILITY INDEX 12/15/2022   Count 10   Sum 24    Oswestry Score (%) 48   Some recent data might be hidden      MARCO preop 36%  6wk  48%      Neck Disability Index (NDI) Questionnaire    Neck Disability Index (NDI) 8/25/2022   Neck Disability Index: Count 10   NDI: Total Score = SUM (points for all 10 findings) 22   Neck Disability in Percent = (Total Score) / 50 * 100 44 (%)   Some recent data might be hidden        Visual Analog Pain Scale  Back Pain Scale 0-10: 6  Right leg pain: 0  Left leg pain: 0    PROMIS-10 Scores  Global Mental Health Score: (P) 13  Global Physical Health Score: (P) 12  PROMIS TOTAL - SUBSCORES: (P) 25    O>   Alert, oriented x 3, cooperative.  Not in CP distress.  LMP  (LMP Unknown)   Surgical incision (thoracic posterior midline): Top part very well-healed.  The bottom is superficially dehisced.  No active drainage.  Does not look infected.  Does not seem like it would warrant formal debridement at this point.  Ambulates independently.  Grossly neurologically intact all extremities.    Imaging:    EOS full spine AP lateral standing x-rays show bilateral segmental posterior instrumentation T5-T9; expandable cage reconstruction T7.  The lowest instrumented vertebra T9 seems to have some degree of compression deformity.  This seems stable compared to immediate postoperative x-rays on 11/9/2022.  No sign of fixation loosening or failure.  Overall, very reassuring x-rays.    A>   1.  6 weeks status post pedicle subtraction osteotomy T7 with expandable cage reconstruction, for large calcified thoracic disc herniation with myelopathy (11/7/2022), with improved balance compared to preoperative.    P>    Congratulated and reassured patient.  Am very happy that this early she could already tell that her balance is much improved.  This is very promising, and very likely would continue to improve over time.  Her significant postsurgical pain is not that much surprising, given the extent of dissection and surgery we had to perform.  Fortunately,  this is starting to get better over time.  I would just recommend giving it more time.  I would also recommend initiating formal PT at this point.  May advance lifting restriction from 10 pounds to 20 pounds.  Also advised her regarding taking commonsense approach to activities.    Regarding her surgical incision, the lower part developed some superficial dehiscence.  I do not think this warrants return to the OR.  I cleaned the wound today with Betadine, placed Steri-Strips.  She has been using bacitracin ointment.  I advised her to continue doing so.  I expect this wound to heal uneventfully without need for major intervention.    - External PT order placed and printed, given to patient.    Return to clinic in 6 weeks (3 months postoperative) c/o spine PA clinic, with a thoracic AP lateral standing x-rays.    Evaristo Allan MD    Orthopaedic Spine Surgery  Dept Orthopaedic Surgery, [facility]  [phone] office, [phone] pager  [email or web address]

## 2022-12-15 NOTE — LETTER
12/15/2022         RE: Emeli Beyer  [address]  [place] [address]        Dear Colleague,    Thank you for referring your patient, Emeli Beyer, to the [facility] [place]. Please see a copy of my visit note below.    Spine Surgical Hx:  ~2004 - ACDF with plate fixation C5-C7 (?).  Complicated by postop hoarseness.  10/16/2014 - ACDF with plate fixation C3-C5 (2 levels); use of structural corticocancellous allograft; Removal of anterior plate C5-C7 (CARLOS Medrano) for CSM.  [Implants: Synthes Vectra plate].  11/03/2017 - ACDF with platle fixation C7-T1; use of Cornerstone corticocancellous allograft 14 x 14mm and [place] DBM.  (CARLOS Medrano).  [Implants: Medtronic Zevo plate].  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  [Implants: Medtronic Solera system, 5.5 mm CoCr rods x 2; Stratosphere expandable cage].    In-Person Visit    Chief Complaint   Patient presents with     Surgical Followup     DOS 11/7/22 S/P Extended Pedicle Subtraction Osteotomy Thoracic 7 with Expandable Cage Reconstruction, Posterior Spinal Fusion Thoracic 5 to Thoracic 9       S>  57 year old female, 6 wks postop; 1st postop visit.    Unaccompanied.  Happy with surgery.  Says her preoperative balance issues are now much better.  She could now close her eyes while standing and not fall.  Walking, standing are also much better.  She is very happy that she is a lot more steady.  She tried to start outpatient physical therapy locally, but was told that she would have to see me first.    Her biggest complaint is postsurgical pain.  She said she continues to have pain wrapping around her rib cage bilaterally.  This was noted postoperatively.  Fortunately, seems to be gradually getting better over time.  Also says that the distal end of her skin incision is not yet fully  healed.    Gradually working on weaning off the pain medicines.  Currently taking Norco 5/325 mg/tab, take max 3 tabs/day; helping.  Of note, preoperatively not taking opioids.    Oswestry (MARCO) Questionnaire    OSWESTRY DISABILITY INDEX 12/15/2022   Count 10   Sum 24   Oswestry Score (%) 48   Some recent data might be hidden      MARCO preop 36%  6wk  48%      Neck Disability Index (NDI) Questionnaire    Neck Disability Index (NDI) 8/25/2022   Neck Disability Index: Count 10   NDI: Total Score = SUM (points for all 10 findings) 22   Neck Disability in Percent = (Total Score) / 50 * 100 44 (%)   Some recent data might be hidden        Visual Analog Pain Scale  Back Pain Scale 0-10: 6  Right leg pain: 0  Left leg pain: 0    PROMIS-10 Scores  Global Mental Health Score: (P) 13  Global Physical Health Score: (P) 12  PROMIS TOTAL - SUBSCORES: (P) 25    O>   Alert, oriented x 3, cooperative.  Not in CP distress.  LMP  (LMP Unknown)   Surgical incision (thoracic posterior midline): Top part very well-healed.  The bottom is superficially dehisced.  No active drainage.  Does not look infected.  Does not seem like it would warrant formal debridement at this point.  Ambulates independently.  Grossly neurologically intact all extremities.    Imaging:    EOS full spine AP lateral standing x-rays show bilateral segmental posterior instrumentation T5-T9; expandable cage reconstruction T7.  The lowest instrumented vertebra T9 seems to have some degree of compression deformity.  This seems stable compared to immediate postoperative x-rays on 11/9/2022.  No sign of fixation loosening or failure.  Overall, very reassuring x-rays.    A>   1.  6 weeks status post pedicle subtraction osteotomy T7 with expandable cage reconstruction, for large calcified thoracic disc herniation with myelopathy (11/7/2022), with improved balance compared to preoperative.    P>    Congratulated and reassured patient.  Am very happy that this early she could  already tell that her balance is much improved.  This is very promising, and very likely would continue to improve over time.  Her significant postsurgical pain is not that much surprising, given the extent of dissection and surgery we had to perform.  Fortunately, this is starting to get better over time.  I would just recommend giving it more time.  I would also recommend initiating formal PT at this point.  May advance lifting restriction from 10 pounds to 20 pounds.  Also advised her regarding taking commonsense approach to activities.    Regarding her surgical incision, the lower part developed some superficial dehiscence.  I do not think this warrants return to the OR.  I cleaned the wound today with Betadine, placed Steri-Strips.  She has been using bacitracin ointment.  I advised her to continue doing so.  I expect this wound to heal uneventfully without need for major intervention.    - External PT order placed and printed, given to patient.    Return to clinic in 6 weeks (3 months postoperative) c/o spine PA clinic, with a thoracic AP lateral standing x-rays.    Evaristo Allan MD    Orthopaedic Spine Surgery  Dept Orthopaedic Surgery, [facility]  [phone] office, [phone] pager  [email or web address]

## 2022-12-16 ENCOUNTER — MYC REFILL (OUTPATIENT)
Dept: ORTHOPEDICS | Facility: CLINIC | Age: 57
End: 2022-12-16

## 2022-12-16 DIAGNOSIS — M48.04 THORACIC SPINAL STENOSIS: ICD-10-CM

## 2022-12-16 RX ORDER — HYDROCODONE BITARTRATE AND ACETAMINOPHEN 5; 325 MG/1; MG/1
1 TABLET ORAL EVERY 8 HOURS PRN
Qty: 21 TABLET | Refills: 0 | Status: SHIPPED | OUTPATIENT
Start: 2022-12-19 | End: 2022-12-28

## 2022-12-16 NOTE — TELEPHONE ENCOUNTER
PDMP reviewed, sent refill of Norco.  Expected refill on Monday.  Max of 3 tablets/day.    Bishop EDINSON Frank PA-C

## 2022-12-24 ENCOUNTER — HEALTH MAINTENANCE LETTER (OUTPATIENT)
Age: 57
End: 2022-12-24

## 2022-12-28 ENCOUNTER — MYC REFILL (OUTPATIENT)
Dept: ORTHOPEDICS | Facility: CLINIC | Age: 57
End: 2022-12-28

## 2022-12-28 DIAGNOSIS — M48.04 THORACIC SPINAL STENOSIS: ICD-10-CM

## 2022-12-28 RX ORDER — HYDROCODONE BITARTRATE AND ACETAMINOPHEN 5; 325 MG/1; MG/1
1 TABLET ORAL EVERY 8 HOURS PRN
Qty: 21 TABLET | Refills: 0 | Status: CANCELLED | OUTPATIENT
Start: 2022-12-28

## 2022-12-28 RX ORDER — HYDROCODONE BITARTRATE AND ACETAMINOPHEN 5; 325 MG/1; MG/1
1 TABLET ORAL EVERY 12 HOURS PRN
Qty: 14 TABLET | Refills: 0 | Status: SHIPPED | OUTPATIENT
Start: 2022-12-28 | End: 2023-01-04

## 2023-01-06 ENCOUNTER — TELEPHONE (OUTPATIENT)
Dept: ORTHOPEDICS | Facility: CLINIC | Age: 58
End: 2023-01-06

## 2023-01-06 NOTE — TELEPHONE ENCOUNTER
[facility] Call Center    Phone Message    May a detailed message be left on voicemail: yes     Reason for Call: Other: Pt is reporting that she is losing feeling in her left hand she has an appt in Feb but wondering if she needs to be seen sooner     Action Taken: Other: ortho [facility]    Travel Screening: Not Applicable

## 2023-01-06 NOTE — TELEPHONE ENCOUNTER
See phone message from  pt & see dictation of postop appt 12-15-22 DOS 11-7-22 Thoracic fusion.  No Hand symptoms at postop appt.  I called back & spoke to pt.    C/O New Left hand symptoms that started about 1 week ago.  No fall or known injury.    States weird feeling in fingers having trouble grasping small things like zippers.    Denies numbness or tingling.  States she had same feelings in Right hand  After previous spine surgery which never resolved.  States 4th spine surgery so we discussed scar tissue issues.    Did start PT ordered & incision healed up well.    I discussed with pt. Could be from normal postop nerve irritation & to use ice & she agreed.,  I Discussed with Kristie HSIEH who reviewed all imaging &previous notes of  & she stated is probably not from her Thoracic Fusion surgery , but could be from her Cervical spine.  HX Cervical Fusion.  She stated 's previous  dictation stated sh had an EMG done but we did not have results.    She advised Ice & observe & will reeval. At appt 2-14-22 with Kristie HSIEH.    If gets significantly worse then call back for sooner appt.  Pt agreed.   I clarified with pt that she had Bilat UE EMG done at [facility] but she does not remember when.  I asked her to sign AMADO & have it faxed to us fax#[phone] & get her own copy to hand carry to our appt &  she agreed. Call back prn. Pt agreed.  MARIYA.Kristie HSIEH//Bobbi Oliveira RN.

## 2023-01-24 ENCOUNTER — DOCUMENTATION ONLY (OUTPATIENT)
Dept: ORTHOPEDICS | Facility: CLINIC | Age: 58
End: 2023-01-24
Payer: COMMERCIAL

## 2023-01-24 NOTE — PROGRESS NOTES
Received Completed forms Yes   Faxed Forms Faxed To: [facility]  Fax Number: [phone]   Sent to HIM (Date) 1/24/23

## 2023-01-30 DIAGNOSIS — M48.04 THORACIC SPINAL STENOSIS: Primary | ICD-10-CM

## 2023-03-10 NOTE — PROGRESS NOTES
Spine Surgical Hx:  ~2004 - ACDF with plate fixation C5-C7 (?).  Complicated by postop hoarseness.  10/16/2014 - ACDF with plate fixation C3-C5 (2 levels); use of structural corticocancellous allograft; Removal of anterior plate C5-C7 (CARLOS Medrano) for CSM.  [Implants: Synthes Vectra plate].  11/03/2017 - ACDF with platle fixation C7-T1; use of Cornerstone corticocancellous allograft 14 x 14mm and [place] DBM.  (CARLOS Medrano).  [Implants: Medtronic Zevo plate].  11/07/2022 - Extended PSO with expandable cage reconstruction T7; PISF T5-T9; use of local autograft and allograft (Jerrell) for large calcified HNP T6-7 with myelopathy.  [Implants: Medtronic Solera system, 5.5 mm CoCr rods x 2; Stratosphere expandable cage].    I have reviewed and updated the patient's Past Medical History, Social History, Family History and Medication List.    ALLERGIES  Penicillins, Clindamycin, and Naproxen    Spine Surgery Follow Up    REFERRING PHYSICIAN: No ref. provider found   PRIMARY CARE PHYSICIAN: Rangel Davis           Chief Complaint:   RECHECK (DOS: 11/07/2022 Transforaminal thoracic diskectomy with interbody fusion thoracic 6-7; use of allograft)      History of Present Illness:  Symptom Profile Including: location of symptoms, onset, severity, exacerbating/alleviating factors, previous treatments:        Emeli Beyer is a 58 year old female who presents today for routine 4 month follow up s/p extended PSO with expandable cage T7, PISF T5-9 (DOS 11/7/22 with Dr. Allan) for large calcified HNP T6-7 with myelopathy. last seen at routine 6 week post op; at that time she could tell her balance was significantly improved, but she had a lot of postsurgical pain. recommended starting outpatient PT, advance lifting limit to 20lbs.    Today, patient reports that she has been doing great up until about 2 to 3 days ago.  She says she was shoveling a lot of wet heavy snow Thursday last week and she developed pretty  severe left anterior/lateral rib cage pain.  This was sharp/spasmy type pain.  Fortunately, the pain has been slowly improving with time.  She did not ever experience pain in the midline thoracic spine or paraspinal muscles.  Only recently started having some mild posterior left rib cage pain.  Denies other red flag symptoms or leg weakness.    MARCO Scores:  Oswestry (MARCO) Questionnaire    OSWESTRY DISABILITY INDEX 3/14/2023   Count 9   Sum 21   Oswestry Score (%) 46.67   Some recent data might be hidden      MARCO preop          36%  6wk                    48%  3mo  46.67%      Neck Disability Index (NDI) Questionnaire    Neck Disability Index (NDI) 8/25/2022   Neck Disability Index: Count 10   NDI: Total Score = SUM (points for all 10 findings) 22   Neck Disability in Percent = (Total Score) / 50 * 100 44 (%)   Some recent data might be hidden             Physical Exam:   PHYSICAL EXAM:   Constitutional - Patient is healthy, well-nourished and appears stated age   Respiratory - Patient is breathing normally and in no respiratory distress.   Skin - No suspicious rashes or lesions.   Psychiatric - Normal mood and affect.   Cardiovascular - Extremities warm and well perfused.   Eyes - Visual acuity is normal to the written word.   ENT - Hearing intact to the spoken word.   GI - No abdominal distention.   Musculoskeletal - Non-antalgic gait without use of assistive devices.        Thoracic Spine:    Appearance - well healed surgical incision midline    Palpation - Non-tender to palpation midline, paraspinals, left posterior rib cage     Strength/ROM - deferred. Grossly 5/5 strength bilateral LE             Imaging:   I ordered and independently reviewed new radiographs at this clinic visit. The results were discussed with the patient.  Findings include:    3/14/2023 XR thoracic spine AP/lateral view x-rays: Stable appearance of T5-9 posterior instrumentation and T7 expandable cage.  No evidence of acute fracture               Assessment and Plan:   Assessment:  58 year old female who presents today for routine 4 month follow up s/p extended PSO with expandable cage T7, PISF T5-9 (DOS 11/7/22 with Dr. Allan) for large calcified HNP T6-7 with myelopathy; progressing as expected     Plan:  Congratulated patient on her recovery after spine surgery.  Reviewed today's XR images with her which demonstrate stable fusion construct without evidence of fracture or hardware failure. It seems as though her left rib cage pain is mostly due to muscle spasm since it has been improving without intervention over the last several days.  She can reach out if pain worsens or does not continue to improve.  We may want to consider advanced imaging at that time.  Otherwise, follow-up in 3 months for routine 6-month follow-up. Reviewed with Dr. Allan after the visit who agrees with the plan.    - refill of robaxin sent to pharmacy  - Follow up in 3 months with repeat EOS full spine standing AP/lateral view XR.    Respectfully,  Kristie Bernabe (sissy Cotter), PAAYLA  Orthopaedic Spine Surgery  Dept Orthopaedic Surgery, [facility]  [facility] [place] Phone: [phone]    20 minutes spent on the date of the encounter doing chart review, review of outside records, review of test results, my own interpretation of tests, documentation, patient history, physical exam & discussion of plan with patient and family.    Dictation Disclaimer: Some of this Note has been completed with voice-recognition dictation software. Although errors are generally corrected real-time, there is the potential for a rare error to be present in the completed chart.

## 2023-03-14 ENCOUNTER — ANCILLARY PROCEDURE (OUTPATIENT)
Dept: GENERAL RADIOLOGY | Facility: CLINIC | Age: 58
End: 2023-03-14
Attending: PHYSICIAN ASSISTANT
Payer: COMMERCIAL

## 2023-03-14 ENCOUNTER — OFFICE VISIT (OUTPATIENT)
Dept: ORTHOPEDICS | Facility: CLINIC | Age: 58
End: 2023-03-14
Payer: COMMERCIAL

## 2023-03-14 DIAGNOSIS — M48.04 THORACIC SPINAL STENOSIS: ICD-10-CM

## 2023-03-14 PROCEDURE — 72070 X-RAY EXAM THORAC SPINE 2VWS: CPT | Performed by: STUDENT IN AN ORGANIZED HEALTH CARE EDUCATION/TRAINING PROGRAM

## 2023-03-14 PROCEDURE — 99214 OFFICE O/P EST MOD 30 MIN: CPT | Performed by: PHYSICIAN ASSISTANT

## 2023-03-14 RX ORDER — METHOCARBAMOL 750 MG/1
750 TABLET, FILM COATED ORAL EVERY 6 HOURS PRN
Qty: 45 TABLET | Refills: 2 | Status: SHIPPED | OUTPATIENT
Start: 2023-03-14

## 2023-03-14 NOTE — LETTER
3/14/2023         RE: Emeli Beyer  [address]   [place] [address]        Dear Colleague,    Thank you for referring your patient, Emeli Beyer, to the [facility] [place]. Please see a copy of my visit note below.    Spine Surgical Hx:  ~2004 - ACDF with plate fixation C5-C7 (?).  Complicated by postop hoarseness.  10/16/2014 - ACDF with plate fixation C3-C5 (2 levels); use of structural corticocancellous allograft; Removal of anterior plate C5-C7 (CARLOS Medrano) for CSM.  [Implants: Synthes Vectra plate].  11/03/2017 - ACDF with platle fixation C7-T1; use of Cornerstone corticocancellous allograft 14 x 14mm and [place] DBM.  (CARLOS Medrano).  [Implants: Medtronic Zevo plate].  11/07/2022 - Extended PSO with expandable cage reconstruction T7; PISF T5-T9; use of local autograft and allograft (Jerrell) for large calcified HNP T6-7 with myelopathy.  [Implants: Medtronic Solera system, 5.5 mm CoCr rods x 2; Stratosphere expandable cage].    I have reviewed and updated the patient's Past Medical History, Social History, Family History and Medication List.    ALLERGIES  Penicillins, Clindamycin, and Naproxen    Spine Surgery Follow Up    REFERRING PHYSICIAN: No ref. provider found   PRIMARY CARE PHYSICIAN: Rangel Davis           Chief Complaint:   RECHECK (DOS: 11/07/2022 Transforaminal thoracic diskectomy with interbody fusion thoracic 6-7; use of allograft)      History of Present Illness:  Symptom Profile Including: location of symptoms, onset, severity, exacerbating/alleviating factors, previous treatments:        Emeli Beyer is a 58 year old female who presents today for routine 4 month follow up s/p extended PSO with expandable cage T7, PISF T5-9 (DOS 11/7/22 with Dr. Allan) for large calcified HNP T6-7 with myelopathy. last seen at routine 6 week post op; at that time she could tell her balance was significantly improved, but she had a lot of postsurgical  pain. recommended starting outpatient PT, advance lifting limit to 20lbs.    Today, patient reports that she has been doing great up until about 2 to 3 days ago.  She says she was shoveling a lot of wet heavy snow Thursday last week and she developed pretty severe left anterior/lateral rib cage pain.  This was sharp/spasmy type pain.  Fortunately, the pain has been slowly improving with time.  She did not ever experience pain in the midline thoracic spine or paraspinal muscles.  Only recently started having some mild posterior left rib cage pain.  Denies other red flag symptoms or leg weakness.    MARCO Scores:  Oswestry (MARCO) Questionnaire    OSWESTRY DISABILITY INDEX 3/14/2023   Count 9   Sum 21   Oswestry Score (%) 46.67   Some recent data might be hidden      MARCO preop          36%  6wk                    48%  3mo  46.67%      Neck Disability Index (NDI) Questionnaire    Neck Disability Index (NDI) 8/25/2022   Neck Disability Index: Count 10   NDI: Total Score = SUM (points for all 10 findings) 22   Neck Disability in Percent = (Total Score) / 50 * 100 44 (%)   Some recent data might be hidden             Physical Exam:   PHYSICAL EXAM:   Constitutional - Patient is healthy, well-nourished and appears stated age   Respiratory - Patient is breathing normally and in no respiratory distress.   Skin - No suspicious rashes or lesions.   Psychiatric - Normal mood and affect.   Cardiovascular - Extremities warm and well perfused.   Eyes - Visual acuity is normal to the written word.   ENT - Hearing intact to the spoken word.   GI - No abdominal distention.   Musculoskeletal - Non-antalgic gait without use of assistive devices.        Thoracic Spine:    Appearance - well healed surgical incision midline    Palpation - Non-tender to palpation midline, paraspinals, left posterior rib cage     Strength/ROM - deferred. Grossly 5/5 strength bilateral LE             Imaging:   I ordered and independently reviewed new  radiographs at this clinic visit. The results were discussed with the patient.  Findings include:    3/14/2023 XR thoracic spine AP/lateral view x-rays: Stable appearance of T5-9 posterior instrumentation and T7 expandable cage.  No evidence of acute fracture              Assessment and Plan:   Assessment:  58 year old female who presents today for routine 4 month follow up s/p extended PSO with expandable cage T7, PISF T5-9 (DOS 11/7/22 with Dr. Allan) for large calcified HNP T6-7 with myelopathy; progressing as expected     Plan:  Congratulated patient on her recovery after spine surgery.  Reviewed today's XR images with her which demonstrate stable fusion construct without evidence of fracture or hardware failure. It seems as though her left rib cage pain is mostly due to muscle spasm since it has been improving without intervention over the last several days.  She can reach out if pain worsens or does not continue to improve.  We may want to consider advanced imaging at that time.  Otherwise, follow-up in 3 months for routine 6-month follow-up. Reviewed with Dr. Allan after the visit who agrees with the plan.    - refill of robaxin sent to pharmacy  - Follow up in 3 months with repeat EOS full spine standing AP/lateral view XR.    Respectfully,  Kristie Bernabe (wangkantnoio Cotter), PAGoyoC  Orthopaedic Spine Surgery  Dept Orthopaedic Surgery, [facility]  [facility] [place] Phone: [phone]    20 minutes spent on the date of the encounter doing chart review, review of outside records, review of test results, my own interpretation of tests, documentation, patient history, physical exam & discussion of plan with patient and family.    Dictation Disclaimer: Some of this Note has been completed with voice-recognition dictation software. Although errors are generally corrected real-time, there is the potential for a rare error to be present in the completed chart.

## 2023-06-15 DIAGNOSIS — Z98.1 S/P FUSION OF THORACIC SPINE: Primary | ICD-10-CM

## 2023-06-20 ENCOUNTER — ANCILLARY PROCEDURE (OUTPATIENT)
Dept: GENERAL RADIOLOGY | Facility: CLINIC | Age: 58
End: 2023-06-20
Attending: ORTHOPAEDIC SURGERY
Payer: COMMERCIAL

## 2023-06-20 ENCOUNTER — OFFICE VISIT (OUTPATIENT)
Dept: ORTHOPEDICS | Facility: CLINIC | Age: 58
End: 2023-06-20
Payer: COMMERCIAL

## 2023-06-20 DIAGNOSIS — M40.36 FLATBACK SYNDROME OF LUMBAR REGION: ICD-10-CM

## 2023-06-20 DIAGNOSIS — Z98.1 S/P FUSION OF THORACIC SPINE: ICD-10-CM

## 2023-06-20 DIAGNOSIS — M41.56 OTHER SECONDARY SCOLIOSIS, LUMBAR REGION: Primary | ICD-10-CM

## 2023-06-20 DIAGNOSIS — M47.816 LUMBAR SPONDYLOSIS: ICD-10-CM

## 2023-06-20 PROCEDURE — 72082 X-RAY EXAM ENTIRE SPI 2/3 VW: CPT | Performed by: STUDENT IN AN ORGANIZED HEALTH CARE EDUCATION/TRAINING PROGRAM

## 2023-06-20 PROCEDURE — 99214 OFFICE O/P EST MOD 30 MIN: CPT | Performed by: ORTHOPAEDIC SURGERY

## 2023-06-20 NOTE — PROGRESS NOTES
Spine Surgical Hx:  ~2004 - ACDF with plate fixation C5-C7 (?).  Complicated by postop hoarseness.  10/16/2014 - ACDF with plate fixation C3-C5 (2 levels); use of structural corticocancellous allograft; Removal of anterior plate C5-C7 (CARLOS Medrano) for CSM.  [Implants: Synthes Vectra plate].  11/03/2017 - ACDF with platle fixation C7-T1; use of Cornerstone corticocancellous allograft 14 x 14mm and [place] DBM.  (CARLOS Medrano).  [Implants: Medtronic Zevo plate].  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  [Implants: Medtronic Solera system, 5.5 mm CoCr rods x 2; Stratosphere expandable cage].      In-Person Visit    Chief Complaint   Patient presents with     Surgical Followup     DOS 11/7/22 S/P Extended Pedicle Subtraction Osteotomy Thoracic 7 with Expandable Cage Reconstruction, Posterior Spinal Fusion Thoracic 5 to Thoracic 9       S>  58 year old female, 7 mos postop; last seen at 3 mos (c/o Kristie Bernabe PA-C).  Per patient, was doing great until about 2-3 days prior to that visit.  Was shoveling snow and developed severe left ribcage pain.  P> Robaxin; RTC in 3 mos (6 mos postop).    Unaccompanied.  Happy with surgery; helped with upper back pain.  Now complaining mainly of LBP, radaiting to both legs.  This is not new, but before surgeyr was overshadowed by upper back sxs.  Now it is becoming more bothersome.    50% back, 50% legs R=L.  Mainly around the knees.  Worse: physical activity.   Would rather sit than stand, but cannot sit for long periods of time either (monie in a vehicle).  Better:  Ice packs.  Rest.    Previous injections:  8/31/17 - Left C8 SNRB with steroid (Dr. Taylor, [place]).  4/5/18 - Right T1 SNRB with steroid (Dr. Taylor, [place]).  4/16/18 - T1-2 IL JAQUI (Dr. Taylor, [facility]).  Had lumbar JAQUI 3-4 yrs ago in [place] (Dr. Taylor, orderd by   [place], [facility]).  Per patient, worked very well, lasted for ~ 2 yrs.  Had done PT previously (for neck, mid and lower back).  Last PT course was ~ 2 months ago ([facility]).      Oswestry (MARCO) Questionnaire        6/20/2023     8:59 AM   OSWESTRY DISABILITY INDEX   Count 9   Sum 18   Oswestry Score (%) 40 %      MARCO preop          36%  6wk                    48%  3mo                 46.67%  7mo  40%      Neck Disability Index (NDI) Questionnaire        8/25/2022    11:44 AM   Neck Disability Index (NDI)   Neck Disability Index: Count 10   NDI: Total Score = SUM (points for all 10 findings) 22   Neck Disability in Percent = (Total Score) / 50 * 100 44 (%)        Visual Analog Pain Scale  Back Pain Scale 0-10: 6  Right leg pain: 0  Left leg pain: 0    O>   Alert, oriented x 3, cooperative.  Not in CP distress.  LMP  (LMP Unknown)   Surgical incision(s) well-healed, no sign of infection.  Ambulates independently.  Grossly neurologically intact.    Imaging:    EOS full spine AP lateral standing x-rays taken today show stable posterior instrumentation T5-T9; stable expandable cage placement T7.  No sign of fixation loosening or failure.  Looking at her lumbar spine, she does have evidence of multilevel lumbar degenerative changes or spondylosis, with degenerative scoliosis.  Also with decrease in lumbar lordosis (lumbar flatback deformity).  No evidence of fracture or spondylolisthesis.    A>   1.  7 months s/p extended PSO T7; PISF T5-T9 (11/7/2022), with improved preoperative thoracic back pain and myelopathic symptoms.  2.  Chronic low back pain, with previous positive response to lumbar JAQUI (3 to 4 years ago, c/o Dr. Taylor, [facility]).  3.  Multilevel lumbar spondylosis with degenerative lumbar scoliosis.  4.  Lumbar flatback deformity.  5.  History of cervical spine fusion C5-T1, care of Dr. Medrano ([facility]).    P>    Had a good discussion with patient.  I am happy to hear that  our surgery 7 months ago helped her significantly, and she is highly satisfied with this.  Her preoperative upper back pain and myelopathic symptoms are improved.  However, she complains today mainly of worsening low back pain.  This is not a new problem.  She had previous lumbar JAQUI performed at [facility] 3 to 4 years ago, which gave her approximately 2 years of good relief.  Unfortunately, despite my attempts at reviewing multiple notes/reports scanned under our media tab, I am unable to find the report of this exact injection, thus, I do not know at what level exactly they injected.  I also do not see any previous lumbar MRI.  Patient herself does not remember having had 1.  Thus, my recommendation is to undergo advanced imaging in the form of lumbar MRI.  Based on x-rays, she does have multilevel lumbar spondylosis with degenerative scoliosis.  Thus, it is likely that she has some degree of spinal stenosis, which may explain her ongoing symptoms.    - Lumbar MRI.  Prefers to have it done locally in [facility].  - Retrieve report of previous lumbar spine injection performed at [facility] by Dr. Taylor ~ 3-4 ;yrs ago.    Virtual RTC after above to review MRI and discuss further options, including possible repeat injection.  25 minutes spent on the date of the encounter doing chart review/review of outside records/review of test results/interpretation of tests/patient visit/documentation/discussion with other provider(s)/discussion with patient and family.      Evaristo Allan MD    Orthopaedic Spine Surgery  Dept Orthopaedic Surgery, [facility]  [phone] office, [phone] pager  [email or web address]

## 2023-06-20 NOTE — LETTER
6/20/2023         RE: Emeli Beyer  [address] [facility] [address]        Dear Colleague,    Thank you for referring your patient, Emeli Beyer, to the [facility] [place]. Please see a copy of my visit note below.    Spine Surgical Hx:  ~2004 - ACDF with plate fixation C5-C7 (?).  Complicated by postop hoarseness.  10/16/2014 - ACDF with plate fixation C3-C5 (2 levels); use of structural corticocancellous allograft; Removal of anterior plate C5-C7 (CARLOS Medrano) for CSM.  [Implants: Synthes Vectra plate].  11/03/2017 - ACDF with platle fixation C7-T1; use of Cornerstone corticocancellous allograft 14 x 14mm and [place] DBM.  (CARLOS Medrano).  [Implants: Medtronic Zevo plate].  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  [Implants: Medtronic Solera system, 5.5 mm CoCr rods x 2; Stratosphere expandable cage].      In-Person Visit    Chief Complaint   Patient presents with    Surgical Followup     DOS 11/7/22 S/P Extended Pedicle Subtraction Osteotomy Thoracic 7 with Expandable Cage Reconstruction, Posterior Spinal Fusion Thoracic 5 to Thoracic 9       S>  58 year old female, 7 mos postop; last seen at 3 mos (c/o Kristie Bernabe PA-C).  Per patient, was doing great until about 2-3 days prior to that visit.  Was shoveling snow and developed severe left ribcage pain.  P> Robaxin; RTC in 3 mos (6 mos postop).    Unaccompanied.  Happy with surgery; helped with upper back pain.  Now complaining mainly of LBP, radaiting to both legs.  This is not new, but before surgeyr was overshadowed by upper back sxs.  Now it is becoming more bothersome.    50% back, 50% legs R=L.  Mainly around the knees.  Worse: physical activity.   Would rather sit than stand, but cannot sit for long periods of time either (monie in a vehicle).  Better:  Ice packs.  Rest.    Previous  injections:  8/31/17 - Left C8 SNRB with steroid (Dr. Taylor, [facility]).  4/5/18 - Right T1 SNRB with steroid (Dr. Taylor, [facility]).  4/16/18 - T1-2 IL JAQUI (Dr. Taylor, [facility]).  Had lumbar JAQUI 3-4 yrs ago in [place] (Dr. Taylor, orderd by Dr. Medrano, [facility]).  Per patient, worked very well, lasted for ~ 2 yrs.  Had done PT previously (for neck, mid and lower back).  Last PT course was ~ 2 months ago ([facility]).      Oswestry (MARCO) Questionnaire        6/20/2023     8:59 AM   OSWESTRY DISABILITY INDEX   Count 9   Sum 18   Oswestry Score (%) 40 %      MARCO preop          36%  6wk                    48%  3mo                 46.67%  7mo  40%      Neck Disability Index (NDI) Questionnaire        8/25/2022    11:44 AM   Neck Disability Index (NDI)   Neck Disability Index: Count 10   NDI: Total Score = SUM (points for all 10 findings) 22   Neck Disability in Percent = (Total Score) / 50 * 100 44 (%)        Visual Analog Pain Scale  Back Pain Scale 0-10: 6  Right leg pain: 0  Left leg pain: 0    O>   Alert, oriented x 3, cooperative.  Not in CP distress.  LMP  (LMP Unknown)   Surgical incision(s) well-healed, no sign of infection.  Ambulates independently.  Grossly neurologically intact.    Imaging:    EOS full spine AP lateral standing x-rays taken today show stable posterior instrumentation T5-T9; stable expandable cage placement T7.  No sign of fixation loosening or failure.  Looking at her lumbar spine, she does have evidence of multilevel lumbar degenerative changes or spondylosis, with degenerative scoliosis.  Also with decrease in lumbar lordosis (lumbar flatback deformity).  No evidence of fracture or spondylolisthesis.    A>   1.  7 months s/p extended PSO T7; PISF T5-T9 (11/7/2022), with improved preoperative thoracic back pain and myelopathic symptoms.  2.  Chronic low back pain, with previous positive response to lumbar JAQUI (3 to 4 years ago, c/o Dr. Taylor, [facility]).  3.   Multilevel lumbar spondylosis with degenerative lumbar scoliosis.  4.  Lumbar flatback deformity.  5.  History of cervical spine fusion C5-T1, care of Dr. Medrano ([facility]).    P>    Had a good discussion with patient.  I am happy to hear that our surgery 7 months ago helped her significantly, and she is highly satisfied with this.  Her preoperative upper back pain and myelopathic symptoms are improved.  However, she complains today mainly of worsening low back pain.  This is not a new problem.  She had previous lumbar JAQUI performed at [facility] 3 to 4 years ago, which gave her approximately 2 years of good relief.  Unfortunately, despite my attempts at reviewing multiple notes/reports scanned under our media tab, I am unable to find the report of this exact injection, thus, I do not know at what level exactly they injected.  I also do not see any previous lumbar MRI.  Patient herself does not remember having had 1.  Thus, my recommendation is to undergo advanced imaging in the form of lumbar MRI.  Based on x-rays, she does have multilevel lumbar spondylosis with degenerative scoliosis.  Thus, it is likely that she has some degree of spinal stenosis, which may explain her ongoing symptoms.    - Lumbar MRI.  Prefers to have it done locally in [facility].  - Retrieve report of previous lumbar spine injection performed at [facility] by Dr. Taylor ~ 3-4 ;yrs ago.    Virtual RTC after above to review MRI and discuss further options, including possible repeat injection.  25 minutes spent on the date of the encounter doing chart review/review of outside records/review of test results/interpretation of tests/patient visit/documentation/discussion with other provider(s)/discussion with patient and family.      Evaristo Allan MD    Orthopaedic Spine Surgery  Dept Orthopaedic Surgery, [facility]  [phone] office, [phone] pager  [email or web address]

## 2023-12-21 NOTE — TELEPHONE ENCOUNTER
See phone message. I called back & spoke to Therapy staff & answered all postop questions.  Call back prn.  They agreed.    Laurel Oliveira RN.    in advance of need  - Apply yellow socks and bracelet for high fall risk patients  - Consider moving patient to room near nurses station  Outcome: Progressing  Goal: Maintain or return to baseline ADL function  Description: INTERVENTIONS:  -  Assess patient's ability to carry out ADLs; assess patient's baseline for ADL function and identify physical deficits which impact ability to perform ADLs (bathing, care of mouth/teeth, toileting, grooming, dressing, etc.)  - Assess/evaluate cause of self-care deficits   - Assess range of motion  - Assess patient's mobility; develop plan if impaired  - Assess patient's need for assistive devices and provide as appropriate  - Encourage maximum independence but intervene and supervise when necessary  - Involve family in performance of ADLs  - Assess for home care needs following discharge   - Consider OT consult to assist with ADL evaluation and planning for discharge  - Provide patient education as appropriate  Outcome: Progressing  Goal: Maintains/Returns to pre admission functional level  Description: INTERVENTIONS:  - Perform AM-PAC 6 Click Basic Mobility/ Daily Activity assessment daily.  - Set and communicate daily mobility goal to care team and patient/family/caregiver.   - Collaborate with rehabilitation services on mobility goals if consulted  - Perform Range of Motion 2 times a day.  - Reposition patient every 2 hours.  - Dangle patient 2 times a day  - Stand patient 2 times a day  - Ambulate patient 2 times a day  - Out of bed to chair 2 times a day   - Out of bed for meals 2 times a day  - Out of bed for toileting  - Record patient progress and toleration of activity level   Outcome: Progressing     Problem: DISCHARGE PLANNING  Goal: Discharge to home or other facility with appropriate resources  Description: INTERVENTIONS:  - Identify barriers to discharge w/patient and caregiver  - Arrange for needed discharge resources and transportation as appropriate  - Identify  discharge learning needs (meds, wound care, etc.)  - Arrange for interpretive services to assist at discharge as needed  - Refer to Case Management Department for coordinating discharge planning if the patient needs post-hospital services based on physician/advanced practitioner order or complex needs related to functional status, cognitive ability, or social support system  Outcome: Progressing     Problem: Knowledge Deficit  Goal: Patient/family/caregiver demonstrates understanding of disease process, treatment plan, medications, and discharge instructions  Description: Complete learning assessment and assess knowledge base.  Interventions:  - Provide teaching at level of understanding  - Provide teaching via preferred learning methods  Outcome: Progressing     Problem: Prexisting or High Potential for Compromised Skin Integrity  Goal: Skin integrity is maintained or improved  Description: INTERVENTIONS:  - Identify patients at risk for skin breakdown  - Assess and monitor skin integrity  - Assess and monitor nutrition and hydration status  - Monitor labs   - Assess for incontinence   - Turn and reposition patient  - Assist with mobility/ambulation  - Relieve pressure over bony prominences  - Avoid friction and shearing  - Provide appropriate hygiene as needed including keeping skin clean and dry  - Evaluate need for skin moisturizer/barrier cream  - Collaborate with interdisciplinary team   - Patient/family teaching  - Consider wound care consult   Outcome: Progressing

## 2024-02-03 ENCOUNTER — HEALTH MAINTENANCE LETTER (OUTPATIENT)
Age: 59
End: 2024-02-03

## 2024-12-29 ENCOUNTER — HEALTH MAINTENANCE LETTER (OUTPATIENT)
Age: 59
End: 2024-12-29

## 2025-03-02 ENCOUNTER — HEALTH MAINTENANCE LETTER (OUTPATIENT)
Age: 60
End: 2025-03-02

## (undated) DEVICE — MARKER SPHERES PASSIVE MEDT PACK 5 8801075

## (undated) DEVICE — LINEN BACK PACK 5440

## (undated) DEVICE — ADH SKIN CLOSURE PREMIERPRO EXOFIN 1.0ML 3470

## (undated) DEVICE — SU VICRYL 2-0 CT-1 27" UND J259H

## (undated) DEVICE — Device

## (undated) DEVICE — SOL NACL 0.9% IRRIG 1000ML BOTTLE 2F7124

## (undated) DEVICE — SUCTION MINISQUAIR SMOKE EVAC CAPTURE DEVICE SQ20012-01

## (undated) DEVICE — DRSG AQUACEL AG HYDROFIBER  3.5X10" 422605

## (undated) DEVICE — SOL ISOPROPYL RUBBING ALCOHOL USP 70% 4OZ HDX-20 I0020

## (undated) DEVICE — TUBING SUCTION MEDI-VAC SOFT 3/16"X20' N520A

## (undated) DEVICE — GLOVE PROTEXIS BLUE W/NEU-THERA 7.5  2D73EB75

## (undated) DEVICE — DRAPE MAYO STAND 23X54 8337

## (undated) DEVICE — SU VICRYL 1 CT-1 36" UND J947H

## (undated) DEVICE — NDL SPINAL 18GA 3.5" 405184

## (undated) DEVICE — BLADE BONE MILL STRK 5.0MM MED 5400-701-000

## (undated) DEVICE — GLOVE PROTEXIS MICRO 8.0  2D73PM80

## (undated) DEVICE — TOOL DISSECT MIDAS MR8 14CM MATCH HEAD 3MM MR8-14MH30

## (undated) DEVICE — IOM SUPPLIES

## (undated) DEVICE — DRAPE LAP W/ARMBOARD 29410

## (undated) DEVICE — SU VICRYL 1 CT-1 CR 8X18" J741D

## (undated) DEVICE — POSITIONER ARMBOARD FOAM 1PAIR LF FP-ARMB1

## (undated) DEVICE — LINEN TOWEL PACK X30 5481

## (undated) DEVICE — SU VICRYL 2-0 CT-2 27" UND J269H

## (undated) DEVICE — SU VICRYL 0 CT-1 27" J340H

## (undated) DEVICE — DRAPE O ARM TUBE 9732722

## (undated) DEVICE — SU MONOCRYL 3-0 PS-2 18" UND Y497G

## (undated) DEVICE — GLOVE PROTEXIS BLUE W/NEU-THERA 8.5  2D73EB85

## (undated) DEVICE — SUCTION MANIFOLD NEPTUNE 2 SYS 4 PORT 0702-020-000

## (undated) DEVICE — GLOVE PROTEXIS MICRO 7.0  2D73PM70

## (undated) DEVICE — PREP CHLORAPREP 26ML TINTED HI-LITE ORANGE 930815

## (undated) DEVICE — SOL WATER IRRIG 1000ML BOTTLE 2F7114

## (undated) RX ORDER — CEFAZOLIN SODIUM 1 G/3ML
INJECTION, POWDER, FOR SOLUTION INTRAMUSCULAR; INTRAVENOUS
Status: DISPENSED
Start: 2022-11-07

## (undated) RX ORDER — ONDANSETRON 2 MG/ML
INJECTION INTRAMUSCULAR; INTRAVENOUS
Status: DISPENSED
Start: 2022-11-07

## (undated) RX ORDER — HYDROMORPHONE HCL IN WATER/PF 6 MG/30 ML
PATIENT CONTROLLED ANALGESIA SYRINGE INTRAVENOUS
Status: DISPENSED
Start: 2022-11-07

## (undated) RX ORDER — EPHEDRINE SULFATE 50 MG/ML
INJECTION, SOLUTION INTRAMUSCULAR; INTRAVENOUS; SUBCUTANEOUS
Status: DISPENSED
Start: 2022-11-07

## (undated) RX ORDER — GLYCOPYRROLATE 0.2 MG/ML
INJECTION INTRAMUSCULAR; INTRAVENOUS
Status: DISPENSED
Start: 2022-11-07

## (undated) RX ORDER — PROPOFOL 10 MG/ML
INJECTION, EMULSION INTRAVENOUS
Status: DISPENSED
Start: 2022-11-07

## (undated) RX ORDER — FENTANYL CITRATE-0.9 % NACL/PF 10 MCG/ML
PLASTIC BAG, INJECTION (ML) INTRAVENOUS
Status: DISPENSED
Start: 2022-11-07

## (undated) RX ORDER — LIDOCAINE HYDROCHLORIDE 20 MG/ML
INJECTION, SOLUTION EPIDURAL; INFILTRATION; INTRACAUDAL; PERINEURAL
Status: DISPENSED
Start: 2022-11-07

## (undated) RX ORDER — SODIUM CHLORIDE, SODIUM LACTATE, POTASSIUM CHLORIDE, CALCIUM CHLORIDE 600; 310; 30; 20 MG/100ML; MG/100ML; MG/100ML; MG/100ML
INJECTION, SOLUTION INTRAVENOUS
Status: DISPENSED
Start: 2022-11-07

## (undated) RX ORDER — DEXAMETHASONE SODIUM PHOSPHATE 4 MG/ML
INJECTION, SOLUTION INTRA-ARTICULAR; INTRALESIONAL; INTRAMUSCULAR; INTRAVENOUS; SOFT TISSUE
Status: DISPENSED
Start: 2022-11-07

## (undated) RX ORDER — CEFAZOLIN SODIUM/WATER 2 G/20 ML
SYRINGE (ML) INTRAVENOUS
Status: DISPENSED
Start: 2022-11-07

## (undated) RX ORDER — FENTANYL CITRATE 50 UG/ML
INJECTION, SOLUTION INTRAMUSCULAR; INTRAVENOUS
Status: DISPENSED
Start: 2022-11-07

## (undated) RX ORDER — GABAPENTIN 300 MG/1
CAPSULE ORAL
Status: DISPENSED
Start: 2022-11-07

## (undated) RX ORDER — ACETAMINOPHEN 325 MG/1
TABLET ORAL
Status: DISPENSED
Start: 2022-11-07

## (undated) RX ORDER — IPRATROPIUM BROMIDE AND ALBUTEROL SULFATE 2.5; .5 MG/3ML; MG/3ML
SOLUTION RESPIRATORY (INHALATION)
Status: DISPENSED
Start: 2022-11-07

## (undated) RX ORDER — HYDROMORPHONE HYDROCHLORIDE 1 MG/ML
INJECTION, SOLUTION INTRAMUSCULAR; INTRAVENOUS; SUBCUTANEOUS
Status: DISPENSED
Start: 2022-11-07

## (undated) RX ORDER — VANCOMYCIN HYDROCHLORIDE 1 G/20ML
INJECTION, POWDER, LYOPHILIZED, FOR SOLUTION INTRAVENOUS
Status: DISPENSED
Start: 2022-11-07

## (undated) RX ORDER — BUPIVACAINE HYDROCHLORIDE AND EPINEPHRINE 2.5; 5 MG/ML; UG/ML
INJECTION, SOLUTION INFILTRATION; PERINEURAL
Status: DISPENSED
Start: 2022-11-07

## (undated) RX ORDER — METHOCARBAMOL 750 MG/1
TABLET, FILM COATED ORAL
Status: DISPENSED
Start: 2022-11-07